# Patient Record
Sex: FEMALE | Race: WHITE | Employment: OTHER | URBAN - METROPOLITAN AREA
[De-identification: names, ages, dates, MRNs, and addresses within clinical notes are randomized per-mention and may not be internally consistent; named-entity substitution may affect disease eponyms.]

---

## 2017-01-06 RX ORDER — CELECOXIB 200 MG/1
CAPSULE ORAL
Qty: 90 CAPSULE | Refills: 1 | Status: SHIPPED | OUTPATIENT
Start: 2017-01-06 | End: 2017-11-27

## 2017-03-07 RX ORDER — MEPROBAMATE 400 MG/1
TABLET ORAL
Qty: 60 TABLET | Refills: 2 | Status: SHIPPED | OUTPATIENT
Start: 2017-03-07 | End: 2017-06-24

## 2017-03-09 RX ORDER — MEPROBAMATE 400 MG/1
TABLET ORAL
Qty: 60 TABLET | Refills: 2 | OUTPATIENT
Start: 2017-03-09

## 2017-03-17 ENCOUNTER — APPOINTMENT (OUTPATIENT)
Dept: GENERAL RADIOLOGY | Facility: HOSPITAL | Age: 82
DRG: 392 | End: 2017-03-17
Attending: HOSPITALIST
Payer: MEDICARE

## 2017-03-17 ENCOUNTER — HOSPITAL ENCOUNTER (INPATIENT)
Facility: HOSPITAL | Age: 82
LOS: 5 days | Discharge: HOME HEALTH | DRG: 392 | End: 2017-03-22
Attending: EMERGENCY MEDICINE | Admitting: HOSPITALIST
Payer: MEDICARE

## 2017-03-17 DIAGNOSIS — E86.0 DEHYDRATION: ICD-10-CM

## 2017-03-17 DIAGNOSIS — R11.2 NAUSEA VOMITING AND DIARRHEA: Primary | ICD-10-CM

## 2017-03-17 DIAGNOSIS — R19.7 NAUSEA VOMITING AND DIARRHEA: Primary | ICD-10-CM

## 2017-03-17 DIAGNOSIS — R53.1 WEAKNESS: ICD-10-CM

## 2017-03-17 PROBLEM — R79.89 AZOTEMIA: Status: ACTIVE | Noted: 2017-03-17

## 2017-03-17 PROBLEM — R73.9 HYPERGLYCEMIA: Status: ACTIVE | Noted: 2017-03-17

## 2017-03-17 LAB
ADENOVIRUS F 40/41 PCR: NEGATIVE
ALBUMIN SERPL BCP-MCNC: 4.1 G/DL (ref 3.5–4.8)
ALP SERPL-CCNC: 52 U/L (ref 32–100)
ALT SERPL-CCNC: 11 U/L (ref 14–54)
ANION GAP SERPL CALC-SCNC: 10 MMOL/L (ref 0–18)
AST SERPL-CCNC: 19 U/L (ref 15–41)
ASTROVIRUS PCR: NEGATIVE
BASOPHILS # BLD: 0 K/UL (ref 0–0.2)
BASOPHILS NFR BLD: 0 %
BILIRUB DIRECT SERPL-MCNC: 0.2 MG/DL (ref 0–0.2)
BILIRUB SERPL-MCNC: 0.7 MG/DL (ref 0.3–1.2)
BUN SERPL-MCNC: 16 MG/DL (ref 8–20)
BUN/CREAT SERPL: 24.6 (ref 10–20)
C CAYETANENSIS DNA SPEC QL NAA+PROBE: NEGATIVE
C. DIFFICILE TOXIN A/B PCR: NEGATIVE
CALCIUM SERPL-MCNC: 9.8 MG/DL (ref 8.5–10.5)
CAMPY SP DNA.DIARRHEA STL QL NAA+PROBE: NEGATIVE
CHLORIDE SERPL-SCNC: 104 MMOL/L (ref 95–110)
CK SERPL-CCNC: 39 U/L (ref 38–234)
CO2 SERPL-SCNC: 23 MMOL/L (ref 22–32)
CREAT SERPL-MCNC: 0.65 MG/DL (ref 0.5–1.5)
CRYPTOSP DNA SPEC QL NAA+PROBE: NEGATIVE
E COLI DNA SPEC QL NAA+PROBE: NEGATIVE
EC STX1+STX2 + H7 FLIC SPEC NAA+PROBE: NEGATIVE
ENTAMOEBA HISTOLYTICA PCR: NEGATIVE
EOSINOPHIL # BLD: 0.1 K/UL (ref 0–0.7)
EOSINOPHIL NFR BLD: 0 %
ERYTHROCYTE [DISTWIDTH] IN BLOOD BY AUTOMATED COUNT: 14.2 % (ref 11–15)
GIARDIA LAMBLIA PCR: NEGATIVE
GLUCOSE SERPL-MCNC: 170 MG/DL (ref 70–99)
HCT VFR BLD AUTO: 43.5 % (ref 35–48)
HGB BLD-MCNC: 14.6 G/DL (ref 12–16)
LIPASE SERPL-CCNC: 26 U/L (ref 22–51)
LYMPHOCYTES # BLD: 1.1 K/UL (ref 1–4)
LYMPHOCYTES NFR BLD: 9 %
MCH RBC QN AUTO: 32 PG (ref 27–32)
MCHC RBC AUTO-ENTMCNC: 33.6 G/DL (ref 32–37)
MCV RBC AUTO: 95.4 FL (ref 80–100)
MONOCYTES # BLD: 0.4 K/UL (ref 0–1)
MONOCYTES NFR BLD: 3 %
NEUTROPHILS # BLD AUTO: 11.4 K/UL (ref 1.8–7.7)
NEUTROPHILS NFR BLD: 87 %
NOROVIRUS GI/GII PCR: NEGATIVE
OSMOLALITY UR CALC.SUM OF ELEC: 289 MOSM/KG (ref 275–295)
P SHIGELLOIDES DNA STL QL NAA+PROBE: NEGATIVE
PLATELET # BLD AUTO: 252 K/UL (ref 140–400)
PMV BLD AUTO: 8.5 FL (ref 7.4–10.3)
POTASSIUM SERPL-SCNC: 3.7 MMOL/L (ref 3.3–5.1)
PROT SERPL-MCNC: 7.2 G/DL (ref 5.9–8.4)
RBC # BLD AUTO: 4.56 M/UL (ref 3.7–5.4)
ROTAVIRUS A PCR: POSITIVE
SALMONELLA DNA SPEC QL NAA+PROBE: NEGATIVE
SAPOVIRUS PCR: NEGATIVE
SODIUM SERPL-SCNC: 137 MMOL/L (ref 136–144)
V CHOLERAE DNA SPEC QL NAA+PROBE: NEGATIVE
VIBRIO DNA SPEC NAA+PROBE: NEGATIVE
WBC # BLD AUTO: 13 K/UL (ref 4–11)
YERSINIA DNA SPEC NAA+PROBE: NEGATIVE

## 2017-03-17 PROCEDURE — 71010 XR CHEST AP PORTABLE  (CPT=71010): CPT

## 2017-03-17 PROCEDURE — 99222 1ST HOSP IP/OBS MODERATE 55: CPT | Performed by: HOSPITALIST

## 2017-03-17 RX ORDER — SODIUM CHLORIDE 9 MG/ML
INJECTION, SOLUTION INTRAVENOUS ONCE
Status: COMPLETED | OUTPATIENT
Start: 2017-03-17 | End: 2017-03-17

## 2017-03-17 RX ORDER — RALOXIFENE HYDROCHLORIDE 60 MG/1
60 TABLET, FILM COATED ORAL
Status: DISCONTINUED | OUTPATIENT
Start: 2017-03-18 | End: 2017-03-22

## 2017-03-17 RX ORDER — ONDANSETRON 2 MG/ML
INJECTION INTRAMUSCULAR; INTRAVENOUS
Status: COMPLETED
Start: 2017-03-17 | End: 2017-03-17

## 2017-03-17 RX ORDER — ATORVASTATIN CALCIUM 40 MG/1
40 TABLET, FILM COATED ORAL NIGHTLY
Status: DISCONTINUED | OUTPATIENT
Start: 2017-03-17 | End: 2017-03-22

## 2017-03-17 RX ORDER — ONDANSETRON 2 MG/ML
4 INJECTION INTRAMUSCULAR; INTRAVENOUS ONCE
Status: COMPLETED | OUTPATIENT
Start: 2017-03-17 | End: 2017-03-17

## 2017-03-17 RX ORDER — LIDOCAINE 50 MG/G
1 PATCH TOPICAL DAILY
Status: DISCONTINUED | OUTPATIENT
Start: 2017-03-17 | End: 2017-03-22

## 2017-03-17 RX ORDER — DEXTROSE AND SODIUM CHLORIDE 5; .45 G/100ML; G/100ML
INJECTION, SOLUTION INTRAVENOUS CONTINUOUS
Status: DISCONTINUED | OUTPATIENT
Start: 2017-03-17 | End: 2017-03-22

## 2017-03-17 RX ORDER — METOPROLOL TARTRATE 100 MG/1
100 TABLET ORAL 2 TIMES DAILY
Status: DISCONTINUED | OUTPATIENT
Start: 2017-03-17 | End: 2017-03-22

## 2017-03-17 RX ORDER — ENOXAPARIN SODIUM 100 MG/ML
40 INJECTION SUBCUTANEOUS DAILY
Status: DISCONTINUED | OUTPATIENT
Start: 2017-03-17 | End: 2017-03-22

## 2017-03-17 RX ORDER — PANTOPRAZOLE SODIUM 40 MG/1
40 TABLET, DELAYED RELEASE ORAL
Status: DISCONTINUED | OUTPATIENT
Start: 2017-03-17 | End: 2017-03-22

## 2017-03-17 RX ORDER — HYDROCODONE BITARTRATE AND ACETAMINOPHEN 7.5; 325 MG/1; MG/1
1 TABLET ORAL EVERY 6 HOURS PRN
Status: DISCONTINUED | OUTPATIENT
Start: 2017-03-17 | End: 2017-03-20

## 2017-03-17 RX ORDER — LOSARTAN POTASSIUM AND HYDROCHLOROTHIAZIDE 25; 100 MG/1; MG/1
1 TABLET ORAL DAILY
COMMUNITY
End: 2018-01-01

## 2017-03-17 RX ORDER — ONDANSETRON 2 MG/ML
4 INJECTION INTRAMUSCULAR; INTRAVENOUS ONCE
Status: DISCONTINUED | OUTPATIENT
Start: 2017-03-17 | End: 2017-03-17

## 2017-03-17 RX ORDER — LOPERAMIDE HYDROCHLORIDE 2 MG/1
2 CAPSULE ORAL EVERY 4 HOURS PRN
Status: DISCONTINUED | OUTPATIENT
Start: 2017-03-17 | End: 2017-03-22

## 2017-03-17 RX ORDER — ACETAMINOPHEN 325 MG/1
650 TABLET ORAL EVERY 6 HOURS PRN
Status: DISCONTINUED | OUTPATIENT
Start: 2017-03-17 | End: 2017-03-22

## 2017-03-17 NOTE — ED NOTES
Patient amb to bathroom with 2 person assist with 1 episode of diarrhea.  Pt states her nausea has subsided

## 2017-03-17 NOTE — PHYSICAL THERAPY NOTE
Pt was to be seen for PT eval in AM. Consulted w/ RN prior to seeing pt. Pt was received supine in bed. Pt declined participating in therapy at this time. Per pt: \"I can't. Every 10 minutes I have diarrhea. This has never happened before.   I'm so embarras

## 2017-03-17 NOTE — ED INITIAL ASSESSMENT (HPI)
Patient states she think she ate something bad yesterday, started with nausea and \"spitting up\" since midnight. Denies abd pain or diarrhea.

## 2017-03-17 NOTE — ED PROVIDER NOTES
Patient Seen in: [facility] AND [facility] Emergency Department    History   Patient presents with:  Nausea/Vomiting/Diarrhea (gastrointestinal)    Stated Complaint: NAUSEA    HPI    Patient complains of vomiting, this began last night, non bloody, non billious. Units by mouth daily. docusate sodium (COLACE) 100 MG Oral Cap,  Take 100 mg by mouth 2 (two) times daily. hydrocodone-acetaminophen (NORCO) 7.5-325 MG Oral Tab,  Take 1 tablet by mouth every 6 (six) hours as needed.    Multiple Vitamins-Minerals (M rashes  PSYCH: calm, cooperative,    Differential includes: viral vs. Food borne or toxin mediated vs. Enteritis vs. Ileus        ED Course     Labs Reviewed   BASIC METABOLIC PANEL (8) - Abnormal; Notable for the following:     Glucose 170 (*)     BUN/CRE DIFFERENTIAL WITH PLATELET.   Procedure                               Abnormality         Status                     ---------                               -----------         ------                     CBC W/ DIFFERENTIAL[[id number]]          Abnormal

## 2017-03-17 NOTE — DISCHARGE PLANNING
RUBEN spoke with son Anthony Smith for initial assessment. Pt lives with son in a single family home with 4 step entry. Son is home during the day to assist as needs arise. Pt has a walker for ambulation. No breathing equipment.  She is usually able to bathe and toilet

## 2017-03-17 NOTE — PLAN OF CARE
Pt admitted from ER alert and oriented x3 not in distress, oriented to unit and room, pt with frequent diarrhea.  Incontinent of bowel and bladder, skin intact, no pain, call light within reach, will continue to monitor

## 2017-03-17 NOTE — H&P
[address] Patient Status:  Inpatient    1924 MRN [id number]   Location [facility] 5SW/SE Attending Rhea oRy MD   Hosp Day # 0 PCP Jesus Thurston MD     Date:  3/17/2017  D CELECOXIB 200 MG Oral Cap TAKE 1 CAPSULE BY MOUTH DAILY. PANTOPRAZOLE SODIUM 40 MG Oral Tab EC TAKE 1 TABLET BY MOUTH EVERY DAY   hydrocortisone 2.5 % External Cream Apply 1 Application topically 2 (two) times daily.    Econazole Nitrate 1 % External Cr CHEST:  Symmetrical movement on inspiration  HEART:  S1 and S2 heard. RRR   LUNGS:  Air entry was good. No crackles or wheezes   ABDOMEN: Soft and non-tender. Bowel sounds were present. MUSCULOSKELETAL:  There was no deformity.   There was full range the clinical documentation in H+P. Based on patients current state of illness, I anticipate that, after discharge, patient will require TBD.

## 2017-03-18 LAB
ANION GAP SERPL CALC-SCNC: 5 MMOL/L (ref 0–18)
BASOPHILS # BLD: 0 K/UL (ref 0–0.2)
BASOPHILS NFR BLD: 0 %
BUN SERPL-MCNC: 17 MG/DL (ref 8–20)
BUN/CREAT SERPL: 26.2 (ref 10–20)
CALCIUM SERPL-MCNC: 8.1 MG/DL (ref 8.5–10.5)
CHLORIDE SERPL-SCNC: 104 MMOL/L (ref 95–110)
CO2 SERPL-SCNC: 23 MMOL/L (ref 22–32)
CREAT SERPL-MCNC: 0.65 MG/DL (ref 0.5–1.5)
EOSINOPHIL # BLD: 0 K/UL (ref 0–0.7)
EOSINOPHIL NFR BLD: 0 %
ERYTHROCYTE [DISTWIDTH] IN BLOOD BY AUTOMATED COUNT: 14.5 % (ref 11–15)
GLUCOSE SERPL-MCNC: 115 MG/DL (ref 70–99)
HCT VFR BLD AUTO: 36.6 % (ref 35–48)
HGB BLD-MCNC: 12.2 G/DL (ref 12–16)
LYMPHOCYTES # BLD: 0.7 K/UL (ref 1–4)
LYMPHOCYTES NFR BLD: 7 %
MAGNESIUM SERPL-MCNC: 1.4 MG/DL (ref 1.8–2.5)
MCH RBC QN AUTO: 31.7 PG (ref 27–32)
MCHC RBC AUTO-ENTMCNC: 33.4 G/DL (ref 32–37)
MCV RBC AUTO: 95.1 FL (ref 80–100)
MONOCYTES # BLD: 1.1 K/UL (ref 0–1)
MONOCYTES NFR BLD: 10 %
NEUTROPHILS # BLD AUTO: 8.8 K/UL (ref 1.8–7.7)
NEUTROPHILS NFR BLD: 82 %
OSMOLALITY UR CALC.SUM OF ELEC: 276 MOSM/KG (ref 275–295)
PLATELET # BLD AUTO: 219 K/UL (ref 140–400)
PMV BLD AUTO: 8 FL (ref 7.4–10.3)
POTASSIUM SERPL-SCNC: 3.1 MMOL/L (ref 3.3–5.1)
RBC # BLD AUTO: 3.85 M/UL (ref 3.7–5.4)
SODIUM SERPL-SCNC: 132 MMOL/L (ref 136–144)
WBC # BLD AUTO: 10.7 K/UL (ref 4–11)

## 2017-03-18 PROCEDURE — 99233 SBSQ HOSP IP/OBS HIGH 50: CPT | Performed by: HOSPITALIST

## 2017-03-18 RX ORDER — ONDANSETRON 2 MG/ML
4 INJECTION INTRAMUSCULAR; INTRAVENOUS EVERY 6 HOURS PRN
Status: DISCONTINUED | OUTPATIENT
Start: 2017-03-18 | End: 2017-03-22

## 2017-03-18 RX ORDER — POTASSIUM CHLORIDE 14.9 MG/ML
20 INJECTION INTRAVENOUS ONCE
Status: COMPLETED | OUTPATIENT
Start: 2017-03-18 | End: 2017-03-19

## 2017-03-18 RX ORDER — ONDANSETRON 2 MG/ML
INJECTION INTRAMUSCULAR; INTRAVENOUS
Status: COMPLETED
Start: 2017-03-18 | End: 2017-03-18

## 2017-03-18 RX ORDER — CLOTRIMAZOLE 1 %
CREAM (GRAM) TOPICAL
Status: DISCONTINUED | OUTPATIENT
Start: 2017-03-18 | End: 2017-03-22

## 2017-03-18 RX ORDER — MAGNESIUM OXIDE 400 MG (241.3 MG MAGNESIUM) TABLET
800 TABLET ONCE
Status: DISCONTINUED | OUTPATIENT
Start: 2017-03-18 | End: 2017-03-22

## 2017-03-18 RX ORDER — ARIPIPRAZOLE 15 MG/1
40 TABLET ORAL EVERY 4 HOURS
Status: DISCONTINUED | OUTPATIENT
Start: 2017-03-18 | End: 2017-03-18

## 2017-03-18 RX ORDER — POTASSIUM CHLORIDE 20 MEQ/1
40 TABLET, EXTENDED RELEASE ORAL EVERY 4 HOURS
Status: DISCONTINUED | OUTPATIENT
Start: 2017-03-18 | End: 2017-03-18

## 2017-03-18 NOTE — PHYSICAL THERAPY NOTE
Attempted to see pt this am. Refused stated that is nauseated. Attempt to see pt again this pm. Per nursing to hold for today as she cont to be nauseated. Will follow up tomorrow.

## 2017-03-18 NOTE — PLAN OF CARE
Problem: Patient Centered Care  Goal: Patient preferences are identified and integrated in the patient’s plan of care  Interventions:  - What would you like us to know as we care for you?   - Provide timely, complete, and accurate information to patient/fa

## 2017-03-18 NOTE — PROGRESS NOTES
Econzaole cream has been substituted with clotrimazole cream per P & T Committee Protocol.     Cain Sorensen, PharmD

## 2017-03-18 NOTE — PLAN OF CARE
Problem: Patient/Family Goals  Goal: Patient/Family Short Term Goal  Patient’s Short Term Goal: decrease diarrhea    Interventions:   - medication  -diet intake  -increase ambulation.  - See additional Care Plan goals for specific interventions   Outcome:

## 2017-03-18 NOTE — PLAN OF CARE
Problem: Patient/Family Goals  Goal: Patient/Family Long Term Goal  Patient’s Long Term Goal: home with family    Interventions:  - decrease diarrhea  -pt/ot  -increase strength  - See additional Care Plan goals for specific interventions   Outcome: Progre

## 2017-03-18 NOTE — PROGRESS NOTES
Received call from microbiology that patient is positive for rotavirus. Dr Bhupendra Evans aware with order imodium q 4 hours prn.

## 2017-03-18 NOTE — OCCUPATIONAL THERAPY NOTE
Attempted to see pt for OT evaluation, however per RN Myla Oppenheim, requested to hold today as pt is sick to her stomach and attempt tomorrow. Will continue to follow.      Franklyn Bojorquez, OTR/L 3/18/2017

## 2017-03-19 LAB
ANION GAP SERPL CALC-SCNC: 8 MMOL/L (ref 0–18)
BASOPHILS # BLD: 0 K/UL (ref 0–0.2)
BASOPHILS NFR BLD: 0 %
BUN SERPL-MCNC: 11 MG/DL (ref 8–20)
BUN/CREAT SERPL: 16.2 (ref 10–20)
CALCIUM SERPL-MCNC: 7.9 MG/DL (ref 8.5–10.5)
CHLORIDE SERPL-SCNC: 100 MMOL/L (ref 95–110)
CO2 SERPL-SCNC: 22 MMOL/L (ref 22–32)
CREAT SERPL-MCNC: 0.68 MG/DL (ref 0.5–1.5)
EOSINOPHIL # BLD: 0 K/UL (ref 0–0.7)
EOSINOPHIL NFR BLD: 0 %
ERYTHROCYTE [DISTWIDTH] IN BLOOD BY AUTOMATED COUNT: 14.1 % (ref 11–15)
GLUCOSE SERPL-MCNC: 105 MG/DL (ref 70–99)
HCT VFR BLD AUTO: 35.6 % (ref 35–48)
HGB BLD-MCNC: 11.9 G/DL (ref 12–16)
LYMPHOCYTES # BLD: 0.8 K/UL (ref 1–4)
LYMPHOCYTES NFR BLD: 7 %
MAGNESIUM SERPL-MCNC: 2.1 MG/DL (ref 1.8–2.5)
MCH RBC QN AUTO: 32.1 PG (ref 27–32)
MCHC RBC AUTO-ENTMCNC: 33.5 G/DL (ref 32–37)
MCV RBC AUTO: 95.9 FL (ref 80–100)
MONOCYTES # BLD: 1.1 K/UL (ref 0–1)
MONOCYTES NFR BLD: 10 %
NEUTROPHILS # BLD AUTO: 9.3 K/UL (ref 1.8–7.7)
NEUTROPHILS NFR BLD: 83 %
OSMOLALITY UR CALC.SUM OF ELEC: 270 MOSM/KG (ref 275–295)
PLATELET # BLD AUTO: 181 K/UL (ref 140–400)
PMV BLD AUTO: 9.1 FL (ref 7.4–10.3)
POTASSIUM SERPL-SCNC: 4.6 MMOL/L (ref 3.3–5.1)
RBC # BLD AUTO: 3.71 M/UL (ref 3.7–5.4)
SODIUM SERPL-SCNC: 130 MMOL/L (ref 136–144)
WBC # BLD AUTO: 11.2 K/UL (ref 4–11)

## 2017-03-19 PROCEDURE — 99233 SBSQ HOSP IP/OBS HIGH 50: CPT | Performed by: HOSPITALIST

## 2017-03-19 RX ORDER — DICYCLOMINE HYDROCHLORIDE 10 MG/1
10 CAPSULE ORAL
Status: DISCONTINUED | OUTPATIENT
Start: 2017-03-19 | End: 2017-03-22

## 2017-03-19 RX ORDER — HYDROCODONE BITARTRATE AND ACETAMINOPHEN 7.5; 325 MG/1; MG/1
1 TABLET ORAL ONCE
Status: COMPLETED | OUTPATIENT
Start: 2017-03-19 | End: 2017-03-19

## 2017-03-19 NOTE — PLAN OF CARE
Problem: Patient Centered Care  Goal: Patient preferences are identified and integrated in the patient’s plan of care  Interventions:  - What would you like us to know as we care for you?   - Provide timely, complete, and accurate information to patient/fa precautions as indicated by assessment.  - Educate pt/family on patient safety including physical limitations  - Instruct pt to call for assistance with activity based on assessment  - Modify environment to reduce risk of injury  - Provide assistive device
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
## 2017-03-19 NOTE — PHYSICAL THERAPY NOTE
PHYSICAL THERAPY EVALUATION - INPATIENT     Room Number: 571/571-A  Evaluation Date: 3/19/2017  Type of Evaluation: Initial PT evaluation  Physician Order: PT Eval and Treat    Presenting Problem: 80year old female was admitted iwth complaint of nause ASSESSMENT  Upper extremity ROM and strength are within functional limits     Lower extremity ROM is within functional limits     Lower extremity strength is within functional limits     BALANCE  Static Sitting: Good  Dynamic Sitting: Good  Static Standing baseline and would benefit from skilled inpatient PT to address the above deficits to assist patient in returning to prior level of function. Recommend home health PT after discharge from acute care.      DISCHARGE RECOMMENDATIONS  PT Discharge Recommendati

## 2017-03-19 NOTE — PROGRESS NOTES
[facility] - [facility]    Progress Note    Adebayo Gilbert Patient Status:  Inpatient    1924 MRN [id number]   Location [facility] 5SW/SE Attending Helio Waldron MD   [address] Day # 2 PCP Jesus Thurston MD       SUBJECTIVE:    No CP, Medications:  magnesium oxide (MAG-OX) tab 800 mg 800 mg Oral Once   ondansetron HCl (ZOFRAN) injection 4 mg 4 mg Intravenous Q6H PRN   clotrimazole (LOTRIMIN) 1 % cream  Topical [email or web address]   Atorvastatin Calcium (LIPITOR) tab 40 mg 40 mg Oral Nightly Pending    Greater than 35 minutes spent, >50% spent counseling re: treatment plan and workup      Josie Briceno MD

## 2017-03-20 LAB
ANION GAP SERPL CALC-SCNC: 9 MMOL/L (ref 0–18)
BASOPHILS # BLD: 0 K/UL (ref 0–0.2)
BASOPHILS NFR BLD: 0 %
BILIRUB UR QL: NEGATIVE
BUN SERPL-MCNC: 7 MG/DL (ref 8–20)
BUN/CREAT SERPL: 12.5 (ref 10–20)
CALCIUM SERPL-MCNC: 7.9 MG/DL (ref 8.5–10.5)
CHLORIDE SERPL-SCNC: 101 MMOL/L (ref 95–110)
CO2 SERPL-SCNC: 20 MMOL/L (ref 22–32)
COLOR UR: YELLOW
CREAT SERPL-MCNC: 0.56 MG/DL (ref 0.5–1.5)
EOSINOPHIL # BLD: 0 K/UL (ref 0–0.7)
EOSINOPHIL NFR BLD: 1 %
ERYTHROCYTE [DISTWIDTH] IN BLOOD BY AUTOMATED COUNT: 14.3 % (ref 11–15)
GLUCOSE SERPL-MCNC: 102 MG/DL (ref 70–99)
GLUCOSE UR-MCNC: NEGATIVE MG/DL
HCT VFR BLD AUTO: 36.2 % (ref 35–48)
HGB BLD-MCNC: 12.3 G/DL (ref 12–16)
KETONES UR-MCNC: NEGATIVE MG/DL
LYMPHOCYTES # BLD: 0.9 K/UL (ref 1–4)
LYMPHOCYTES NFR BLD: 11 %
MCH RBC QN AUTO: 32.2 PG (ref 27–32)
MCHC RBC AUTO-ENTMCNC: 34.1 G/DL (ref 32–37)
MCV RBC AUTO: 94.4 FL (ref 80–100)
MONOCYTES # BLD: 1 K/UL (ref 0–1)
MONOCYTES NFR BLD: 12 %
NEUTROPHILS # BLD AUTO: 6.1 K/UL (ref 1.8–7.7)
NEUTROPHILS NFR BLD: 76 %
NITRITE UR QL STRIP.AUTO: NEGATIVE
OSMOLALITY UR CALC.SUM OF ELEC: 268 MOSM/KG (ref 275–295)
PH UR: 6 [PH] (ref 5–8)
PLATELET # BLD AUTO: 189 K/UL (ref 140–400)
PMV BLD AUTO: 7.8 FL (ref 7.4–10.3)
POTASSIUM SERPL-SCNC: 3.9 MMOL/L (ref 3.3–5.1)
PROT UR-MCNC: 30 MG/DL
RBC # BLD AUTO: 3.84 M/UL (ref 3.7–5.4)
RBC #/AREA URNS AUTO: 156 /HPF
SODIUM SERPL-SCNC: 130 MMOL/L (ref 136–144)
SP GR UR STRIP: 1 (ref 1–1.03)
UROBILINOGEN UR STRIP-ACNC: <2
VIT C UR-MCNC: NEGATIVE MG/DL
WBC # BLD AUTO: 8 K/UL (ref 4–11)
WBC #/AREA URNS AUTO: 951 /HPF

## 2017-03-20 PROCEDURE — 99232 SBSQ HOSP IP/OBS MODERATE 35: CPT | Performed by: HOSPITALIST

## 2017-03-20 RX ORDER — HYDROCODONE BITARTRATE AND ACETAMINOPHEN 7.5; 325 MG/1; MG/1
1 TABLET ORAL EVERY 4 HOURS PRN
Status: DISCONTINUED | OUTPATIENT
Start: 2017-03-20 | End: 2017-03-22

## 2017-03-20 NOTE — CM/SW NOTE
Met with patient at bedside. Patient lives at home with son. Asked patient regarding plans for discharge. Patient states she will be going home with son when doctor orders her discharge. [facility].  Offer

## 2017-03-20 NOTE — PROGRESS NOTES
[facility]    Progress Note    Louann Singh Patient Status:  Inpatient    1924 MRN [id number]   Location [facility] 5SW/SE Attending John Mi MD   Hosp Day # 3 PCP Jamin Jackson MD       SUBJECTIVE:    No CP, Calcium (LIPITOR) tab 40 mg 40 mg Oral Nightly   Cholecalciferol (VITAMIN D) 1000 units tab 2,000 Units 2,000 Units Oral Daily   hydrocortisone 2.5 % cream 1 Application 1 Application Topical BID   lidocaine (LIDODERM) 5 % 1 patch 1 patch Transdermal Daily

## 2017-03-20 NOTE — HOME CARE LIAISON
MET WITH PTNT TO DISCUSS HOME HEALTH SERVICES AND COVERAGE CRITERIA. PTNT AGREEABLE TO [address] [place] [address]. PTNT GIVEN RESIDENTIAL BROCHURE AND CONTACT INFORMATION. Nuria RN/PT.   PTNT ADMITTED TO [facility] 3/17/17, D/T N&V, POSI

## 2017-03-20 NOTE — PLAN OF CARE
Problem: Patient Centered Care  Goal: Patient preferences are identified and integrated in the patient’s plan of care  Interventions:  - What would you like us to know as we care for you?   - Provide timely, complete, and accurate information to patient/fa injury  INTERVENTIONS:  - Assess pt frequently for physical needs  - Identify cognitive and physical deficits and behaviors that affect risk of falls.   - [place] fall precautions as indicated by assessment.  - Educate pt/family on patient safety includin

## 2017-03-21 LAB
BACTERIA UR QL AUTO: NEGATIVE /HPF
BILIRUB UR QL: NEGATIVE
CLARITY UR: CLEAR
COLOR UR: YELLOW
GLUCOSE UR-MCNC: NEGATIVE MG/DL
KETONES UR-MCNC: NEGATIVE MG/DL
LEUKOCYTE ESTERASE UR QL STRIP.AUTO: NEGATIVE
MAGNESIUM SERPL-MCNC: 1.4 MG/DL (ref 1.8–2.5)
NITRITE UR QL STRIP.AUTO: NEGATIVE
PH UR: 5 [PH] (ref 5–8)
PROT UR-MCNC: NEGATIVE MG/DL
RBC #/AREA URNS AUTO: 2 /HPF
SP GR UR STRIP: 1.01 (ref 1–1.03)
UROBILINOGEN UR STRIP-ACNC: <2
VIT C UR-MCNC: NEGATIVE MG/DL
WBC #/AREA URNS AUTO: <1 /HPF

## 2017-03-21 PROCEDURE — 99232 SBSQ HOSP IP/OBS MODERATE 35: CPT | Performed by: HOSPITALIST

## 2017-03-21 RX ORDER — MAGNESIUM OXIDE 400 MG (241.3 MG MAGNESIUM) TABLET
800 TABLET ONCE
Status: COMPLETED | OUTPATIENT
Start: 2017-03-21 | End: 2017-03-21

## 2017-03-21 NOTE — PLAN OF CARE
Problem: Patient Centered Care  Goal: Patient preferences are identified and integrated in the patient’s plan of care  Interventions:  - What would you like us to know as we care for you?   - Provide timely, complete, and accurate information to patient/fa Norco's frequency to be adjusted, because Q6hrs was not sufficient. The frequency has been changed from Q6hrs to Q4hrs.      Problem: SAFETY ADULT - FALL  Goal: Free from fall injury  INTERVENTIONS:  - Assess pt frequently for physical needs  - Identify cog

## 2017-03-21 NOTE — PLAN OF CARE
Problem: Patient Centered Care  Goal: Patient preferences are identified and integrated in the patient’s plan of care  Interventions:  - What would you like us to know as we care for you?   - Provide timely, complete, and accurate information to patient/fa new pain   Outcome: Progressing  Norco given PRN for pain  Will continue to monitor safety measures met     Problem: SAFETY ADULT - FALL  Goal: Free from fall injury  INTERVENTIONS:  - Assess pt frequently for physical needs  - Identify cognitive and physi

## 2017-03-21 NOTE — PROGRESS NOTES
[facility]    Progress Note    Abena Davinin Patient Status:  Inpatient    1924 MRN [id number]   Location [facility] 5SW/SE Attending Jaedn Pereyra MD   Hosp Day # 4 PCP Gonsalo Banks MD       SUBJECTIVE:    No CP, tab 100 mg 100 mg Oral BID   Pantoprazole Sodium (PROTONIX) EC tab 40 mg 40 mg Oral Daily   Raloxifene HCl (EVISTA) tab 60 mg 60 mg Oral Daily   Enoxaparin Sodium (LOVENOX) 40 MG/0.4ML injection 40 mg 40 mg Subcutaneous Daily   dextrose 5 %-0.45 % NaCl inf

## 2017-03-21 NOTE — CM/SW NOTE
Met with patient at bedside to explain the BPCI/Medicare program. Patient agreed with phone follow up for 3 months from [address] [place] [address] after discharge from [address] [place] [address]. Patient was enrolled under DRG   392  . BPCI/Medicare letter and brochure provided.   Patient i

## 2017-03-21 NOTE — PHYSICAL THERAPY NOTE
RN reports patient is not feeling well this afternoon, patient declines therapy as she is having diarrhea again. Will check back at a later time.

## 2017-03-21 NOTE — OCCUPATIONAL THERAPY NOTE
Attempted OT treatment this afternoon. Patient reports feeling nauseous and continuing to have diarrhea. She requested I return at a later time. Will reattempt as able.

## 2017-03-22 VITALS
TEMPERATURE: 98 F | BODY MASS INDEX: 35.14 KG/M2 | WEIGHT: 179 LBS | HEIGHT: 60 IN | DIASTOLIC BLOOD PRESSURE: 81 MMHG | HEART RATE: 70 BPM | SYSTOLIC BLOOD PRESSURE: 179 MMHG | OXYGEN SATURATION: 99 % | RESPIRATION RATE: 18 BRPM

## 2017-03-22 LAB — MAGNESIUM SERPL-MCNC: 1.5 MG/DL (ref 1.8–2.5)

## 2017-03-22 PROCEDURE — 99239 HOSP IP/OBS DSCHRG MGMT >30: CPT | Performed by: HOSPITALIST

## 2017-03-22 RX ORDER — MAGNESIUM OXIDE 400 MG (241.3 MG MAGNESIUM) TABLET
400 TABLET 2 TIMES DAILY
Qty: 14 TABLET | Refills: 0 | Status: SHIPPED
Start: 2017-03-22 | End: 2018-01-01

## 2017-03-22 RX ORDER — MAGNESIUM OXIDE 400 MG (241.3 MG MAGNESIUM) TABLET
800 TABLET ONCE
Status: COMPLETED | OUTPATIENT
Start: 2017-03-22 | End: 2017-03-22

## 2017-03-22 RX ORDER — MAGNESIUM OXIDE 400 MG (241.3 MG MAGNESIUM) TABLET
400 TABLET 2 TIMES DAILY
Status: DISCONTINUED | OUTPATIENT
Start: 2017-03-22 | End: 2017-03-22

## 2017-03-22 NOTE — PLAN OF CARE
Pt discharged home in stable condition with all of her belongings accompanied by [employer] staff. Discharge instructions given to pt. Pt verbalized understanding. Son notified of discharge by . Iv discontinued.

## 2017-03-22 NOTE — PLAN OF CARE
Problem: Patient Centered Care  Goal: Patient preferences are identified and integrated in the patient’s plan of care  Interventions:  - What would you like us to know as we care for you?   - Provide timely, complete, and accurate information to patient/fa FALL  Goal: Free from fall injury  INTERVENTIONS:  - Assess pt frequently for physical needs  - Identify cognitive and physical deficits and behaviors that affect risk of falls.   - [place] fall precautions as indicated by assessment.  - Educate pt/family

## 2017-03-22 NOTE — DISCHARGE SUMMARY
[facility]    Discharge Summary    Evan Wiggins Patient Status:  Inpatient    1924 MRN [id number]   Location [facility] 5SW/SE Attending Kiran Ugarte MD   Hosp Day # 5 PCP Grey Ocasio MD     Date of Admission: outpt    8.  Pyuria  -given rocephin x 1  -likely contaminated specimen  -straight cath for urine with culture reflex was negative     VTE P:  lovenox    Discharge Medications:      Discharge Medications      START taking these medications       Instruction lidocaine 5 % Ptch   Commonly known as:  LIDODERM         Refills:  0       Losartan Potassium-HCTZ 100-25 MG Tabs   Commonly known as:  HYZAAR        Take 1 tablet by mouth daily.     Refills:  0       Meprobamate 400 MG Tabs        TAKE 1 TABLET BY MOUTH

## 2017-03-22 NOTE — PLAN OF CARE
Problem: Patient Centered Care  Goal: Patient preferences are identified and integrated in the patient’s plan of care  Interventions:  - What would you like us to know as we care for you?   - Provide timely, complete, and accurate information to patient/fa fall injury  INTERVENTIONS:  - Assess pt frequently for physical needs  - Identify cognitive and physical deficits and behaviors that affect risk of falls.   - [place] fall precautions as indicated by assessment.  - Educate pt/family on patient safety inc

## 2017-03-22 NOTE — PHYSICAL THERAPY NOTE
Patient received in bed in supine but refusing to work with PT.  Patient has only been seen for evaluation but refused repeated attempts for therapy while in hospital. She is currently agitated about being discharged in 1 hour and not wearing regular clothe

## 2017-03-22 NOTE — DISCHARGE PLANNING
[facility] notified and will send medicar here to transport pt home toay at 12:30. Message left for patients son and patients sister Maikel Cantu notified that she will be on her way home at 12:30 today.  [place] [id number] notified of pt dc today    Patient notified

## 2017-03-23 ENCOUNTER — TELEPHONE (OUTPATIENT)
Dept: NEPHROLOGY | Facility: CLINIC | Age: 82
End: 2017-03-23

## 2017-03-23 NOTE — TELEPHONE ENCOUNTER
CATHY Mcgrath/[facility] called to let Dr Delia Blackmno know that pt will be starting with home care on 3/27/17 per pt request.  No need to call back unless questions. Aware MKK not in office today.

## 2017-03-27 NOTE — TELEPHONE ENCOUNTER
CATHY Bermudez/[facility] states that pt is now requesting to delay home care until 3/31/17. No need to call back unless questions.

## 2017-03-31 NOTE — TELEPHONE ENCOUNTER
Aidan calling to inform MKK that pt has refused [facility]. Pt is d/c pt. For add'l questions pls call. Thank you.

## 2017-04-03 NOTE — TELEPHONE ENCOUNTER
CATHY Cowan/[facility] called to let MKK know that pt now would like [facility] so she has appt scheduled on 4/4/17.

## 2017-04-04 NOTE — TELEPHONE ENCOUNTER
Spoke to [place] since we've been getting several messages of patient refusing [facility] then deciding to do it. He states as of now, she's just refusing therapy but nurse is trying to go out to see her today.

## 2017-04-04 NOTE — TELEPHONE ENCOUNTER
RN calling to inform that the pt. Is refusing for a 2nd time, to receive home health services and PT.

## 2017-04-21 ENCOUNTER — TELEPHONE (OUTPATIENT)
Dept: CARDIOLOGY CLINIC | Facility: CLINIC | Age: 82
End: 2017-04-21

## 2017-04-21 RX ORDER — METOPROLOL TARTRATE 100 MG/1
100 TABLET ORAL 2 TIMES DAILY
Qty: 60 TABLET | Refills: 0 | Status: SHIPPED | OUTPATIENT
Start: 2017-04-21 | End: 2017-05-22

## 2017-04-21 NOTE — TELEPHONE ENCOUNTER
Metoprolol Tartr 100mg tabs, take 1 tab by mouth 2 (two) times daily, qty 180 w/ 3 refills    Current outpatient prescriptions:   •  Metoprolol Tartrate 100 MG Oral Tab, Take 1 tablet (100 mg total) by mouth 2 (two) times daily. , Disp: 180 tablet, Rfl: 3

## 2017-05-22 ENCOUNTER — TELEPHONE (OUTPATIENT)
Dept: CARDIOLOGY CLINIC | Facility: CLINIC | Age: 82
End: 2017-05-22

## 2017-05-22 RX ORDER — METOPROLOL TARTRATE 100 MG/1
100 TABLET ORAL 2 TIMES DAILY
Qty: 60 TABLET | Refills: 0 | Status: SHIPPED | OUTPATIENT
Start: 2017-05-22 | End: 2017-06-21

## 2017-05-22 NOTE — TELEPHONE ENCOUNTER
Metoprolol Tartr 100mg tabs, take 1 tab (100mg total) by mouth 2 (twp) times daily, qty 60    Current outpatient prescriptions:   •  Metoprolol Tartrate 100 MG Oral Tab, Take 1 tablet (100 mg total) by mouth 2 (two) times daily.  Make appointment with MD.,

## 2017-05-22 NOTE — TELEPHONE ENCOUNTER
Pt was reminded she needs an office visit. Pt will call back later as she is getting ready for another appointment now.

## 2017-05-23 ENCOUNTER — TELEPHONE (OUTPATIENT)
Dept: OBGYN CLINIC | Facility: CLINIC | Age: 82
End: 2017-05-23

## 2017-05-23 NOTE — TELEPHONE ENCOUNTER
Pt was supposed to return in 3 months. Last cleaning in October so pt needs an appt. Please help her schedule this, thanks!

## 2017-05-25 ENCOUNTER — OFFICE VISIT (OUTPATIENT)
Dept: OBGYN CLINIC | Facility: CLINIC | Age: 82
End: 2017-05-25

## 2017-05-25 VITALS — SYSTOLIC BLOOD PRESSURE: 159 MMHG | HEART RATE: 69 BPM | DIASTOLIC BLOOD PRESSURE: 83 MMHG

## 2017-05-25 DIAGNOSIS — N81.11 MIDLINE CYSTOCELE: Primary | ICD-10-CM

## 2017-05-25 PROCEDURE — 99212 OFFICE O/P EST SF 10 MIN: CPT | Performed by: OBSTETRICS & GYNECOLOGY

## 2017-05-25 RX ORDER — PANTOPRAZOLE SODIUM 40 MG/1
TABLET, DELAYED RELEASE ORAL
COMMUNITY
Start: 2017-04-03 | End: 2018-01-01 | Stop reason: ALTCHOICE

## 2017-05-25 NOTE — TELEPHONE ENCOUNTER
Pt calling to ask when her last pessary cleaning was because she knows she is overdue. Informed her she was last seen in October.  Pt has an appt today with JUICE but states she might need to reschedule to next week because her cat is sick and she is \"not del rio

## 2017-05-26 NOTE — PROGRESS NOTES
Pessary Check     Bertaeveline Nina [id number]  here for pessary check. Last seen 10/14/16. Doing well with pessary. Has good support. Has no complaints.     Pessary type: ring with support pessary    Pessary removed, cleansed and replaced without diffi

## 2017-06-21 ENCOUNTER — TELEPHONE (OUTPATIENT)
Dept: CARDIOLOGY CLINIC | Facility: CLINIC | Age: 82
End: 2017-06-21

## 2017-06-21 RX ORDER — METOPROLOL TARTRATE 100 MG/1
100 TABLET ORAL 2 TIMES DAILY
Qty: 60 TABLET | Refills: 0 | Status: SHIPPED | OUTPATIENT
Start: 2017-06-21 | End: 2017-07-26

## 2017-06-21 NOTE — TELEPHONE ENCOUNTER
Rx sent, S/w pt- she will call back next week to make appt, she states it's difficult for her to come out to see doctor.

## 2017-06-21 NOTE — TELEPHONE ENCOUNTER
Metoprolol Tartr 100mg tabs, take 1 tab (100mg total) by mouth 2 (two) times daily, qty 60    Current outpatient prescriptions:   •  Metoprolol Tartrate 100 MG Oral Tab, Take 1 tablet (100 mg total) by mouth 2 (two) times daily.  Make appointment with MD.,

## 2017-06-26 RX ORDER — MEPROBAMATE 400 MG/1
TABLET ORAL
Qty: 60 TABLET | Refills: 2 | OUTPATIENT
Start: 2017-06-26 | End: 2017-10-17

## 2017-06-26 NOTE — TELEPHONE ENCOUNTER
Patient returned call. She is aware that Dr. Charlotte Rea would like her to do lab work ordered 12/2/16. Instructed to fast 12 hours.  She is having eye surgery on Tuesday and said she would do lab work as requested and will call back to schedule a follow up vis

## 2017-06-27 ENCOUNTER — TELEPHONE (OUTPATIENT)
Dept: NEPHROLOGY | Facility: CLINIC | Age: 82
End: 2017-06-27

## 2017-06-27 NOTE — TELEPHONE ENCOUNTER
Rx for meprobamate was authorized by [employer] yesterday but due to Epic update, [facility]'s printer wasn't working so script did not get printed or signed.  Called [facility], spoke to Hannah Butcher, and gave verbal authorization for meprobamate 400 mg BID PRN #60 with 2 additional

## 2017-06-27 NOTE — TELEPHONE ENCOUNTER
[facility] requesting refills for Meprobamate 400 mg. Pls call. Thank you.       Current Outpatient Prescriptions:  MEPROBAMATE 400 MG Oral Tab TAKE 1 TABLET BY MOUTH TWICE A DAY AS NEEDED FOR PAIN Disp: 60 tablet Rfl: 2

## 2017-07-26 ENCOUNTER — TELEPHONE (OUTPATIENT)
Dept: CARDIOLOGY CLINIC | Facility: CLINIC | Age: 82
End: 2017-07-26

## 2017-07-26 RX ORDER — METOPROLOL TARTRATE 100 MG/1
100 TABLET ORAL 2 TIMES DAILY
Qty: 60 TABLET | Refills: 0 | Status: SHIPPED | OUTPATIENT
Start: 2017-07-26 | End: 2017-10-02

## 2017-08-10 ENCOUNTER — TELEPHONE (OUTPATIENT)
Dept: NEPHROLOGY | Facility: CLINIC | Age: 82
End: 2017-08-10

## 2017-08-10 NOTE — TELEPHONE ENCOUNTER
----- Message from aMria C Yang Stringer, Dr. Zepead, ([facility] or [facility])- There is also sports medicine available at [place].    Thank you,  WILDA Yang   PATY Rn has no info for pt  - pls fax hx , med list ,  orders - pls fax to [phone]

## 2017-08-10 NOTE — TELEPHONE ENCOUNTER
Spoke to [facility] nurse. She states HH referral was placed by [facility] Dr. Darren Cerda. Intake visit was completed and [address] feels patient is not being care for. She thinks patient needs to be placed in a Nursing home.  She had me schedule an appointmen

## 2017-08-10 NOTE — TELEPHONE ENCOUNTER
No record of patient being admitted to [facility]. Pleaase advise, no orders in chart and  is asking for orders and history.

## 2017-08-21 ENCOUNTER — TELEPHONE (OUTPATIENT)
Dept: CARDIOLOGY CLINIC | Facility: CLINIC | Age: 82
End: 2017-08-21

## 2017-08-21 NOTE — TELEPHONE ENCOUNTER
Pt was called and reminded she needs a follow up visit with MDB. Pt states she is bed ridden and is not able to go down the stairs. She states she still has enough medication at home. She does not need this refill at this time.  She will call back to schedu

## 2017-08-21 NOTE — TELEPHONE ENCOUNTER
Metoprolol Tartrate 100mg tab, take 1 tab (100mg total) by mouth 2 (two) times daily, qty 60. Pt cancelled 08/08 appt w/ RENETTA.       Current Outpatient Prescriptions:   •  Metoprolol Tartrate 100 MG Oral Tab, Take 1 tablet (100 mg total) by mouth 2 (two) ti

## 2017-09-15 ENCOUNTER — TELEPHONE (OUTPATIENT)
Dept: NEPHROLOGY | Facility: CLINIC | Age: 82
End: 2017-09-15

## 2017-09-15 NOTE — TELEPHONE ENCOUNTER
Pt and her son both have appt for Monday - she is at 2:40 and her son is at 1 pm , asking if she can be seen earlier - closer to sons time - she is in a wheel chair

## 2017-10-02 ENCOUNTER — TELEPHONE (OUTPATIENT)
Dept: CARDIOLOGY CLINIC | Facility: CLINIC | Age: 82
End: 2017-10-02

## 2017-10-02 RX ORDER — METOPROLOL TARTRATE 100 MG/1
100 TABLET ORAL 2 TIMES DAILY
Qty: 60 TABLET | Refills: 0 | Status: SHIPPED | OUTPATIENT
Start: 2017-10-02 | End: 2017-10-30

## 2017-10-02 NOTE — TELEPHONE ENCOUNTER
Pt called for refill. Pt states that she is not able to make appt at this time due to health issues:      Current Outpatient Prescriptions:   •  Metoprolol Tartrate 100 MG Oral Tab, Take 1 tablet (100 mg total) by mouth 2 (two) times daily. , Disp: 60 tabl

## 2017-10-02 NOTE — TELEPHONE ENCOUNTER
S/w Sudarshan Jay and states she is still not able to get out of her home and she is scared to fall.  She has cancelled her appointment with her PCP and states she is not able to come in for an office visit with Dr. Robert Lovelace    Pt has home health nurse coming toda

## 2017-10-13 ENCOUNTER — TELEPHONE (OUTPATIENT)
Dept: NEPHROLOGY | Facility: CLINIC | Age: 82
End: 2017-10-13

## 2017-10-13 DIAGNOSIS — R73.09 ELEVATED GLUCOSE LEVEL: Primary | ICD-10-CM

## 2017-10-13 NOTE — TELEPHONE ENCOUNTER
Patient contacted. Advised that lab orders were faxed to [facility]. Patient is going to call the office to schedule an appointment after she has her labs drawn. Instructed to fast 12 hours for lab work.

## 2017-10-13 NOTE — TELEPHONE ENCOUNTER
Pt is requesting to have blood work faxed over for [address] provider to complete that is needed. Pt states she has trouble walking and it would be very hard for her to come into the lab.  Pt states Home health provider would fax results from blood work c

## 2017-10-13 NOTE — TELEPHONE ENCOUNTER
Glycohgb added to existing lab orders and faxed to [facility] Attn: Verónica Calles as requested.

## 2017-10-15 DIAGNOSIS — E78.00 PURE HYPERCHOLESTEROLEMIA: Primary | ICD-10-CM

## 2017-10-16 RX ORDER — RALOXIFENE HYDROCHLORIDE 60 MG/1
60 TABLET, FILM COATED ORAL
Qty: 30 TABLET | Refills: 0 | Status: SHIPPED | OUTPATIENT
Start: 2017-10-16 | End: 2017-12-18

## 2017-10-16 NOTE — TELEPHONE ENCOUNTER
Patient contacted. She is aware that lab work and office visit are needed but she states great difficulty getting out of the house. She will try. Lab orders entered in system and patient is aware that she needs to fast 12 hours for lab work.

## 2017-10-17 RX ORDER — MEPROBAMATE 400 MG/1
TABLET ORAL
Qty: 60 TABLET | Refills: 0 | Status: SHIPPED | OUTPATIENT
Start: 2017-10-17 | End: 2017-11-27

## 2017-10-17 NOTE — TELEPHONE ENCOUNTER
Pt called for status on refill. She states that she is having problems get lab tests done that were requested because she is bedridden. Please call. Pt is out of meds.

## 2017-10-23 ENCOUNTER — TELEPHONE (OUTPATIENT)
Dept: NEPHROLOGY | Facility: CLINIC | Age: 82
End: 2017-10-23

## 2017-10-23 NOTE — TELEPHONE ENCOUNTER
Per Dr. Candy Kumar are ok except cholesterol is too high. See soon to review. Patient states she will call back to schedule an office visit. Original results sent to scan. Copy in hold folder.

## 2017-10-30 ENCOUNTER — TELEPHONE (OUTPATIENT)
Dept: CARDIOLOGY CLINIC | Facility: CLINIC | Age: 82
End: 2017-10-30

## 2017-10-30 RX ORDER — METOPROLOL TARTRATE 100 MG/1
100 TABLET ORAL 2 TIMES DAILY
Qty: 60 TABLET | Refills: 0 | Status: SHIPPED | OUTPATIENT
Start: 2017-10-30 | End: 2017-12-05

## 2017-10-30 NOTE — TELEPHONE ENCOUNTER
Metoprolol Tartr 100mg tabs, take 1 (100mg total) by mouth 2 (two) times daily, qty 60    Current Outpatient Prescriptions:   •  Metoprolol Tartrate 100 MG Oral Tab, Take 1 tablet (100 mg total) by mouth 2 (two) times daily. , Disp: 60 tablet, Rfl: 0

## 2017-11-27 RX ORDER — MEPROBAMATE 400 MG/1
TABLET ORAL
Qty: 60 TABLET | Refills: 0 | Status: SHIPPED | OUTPATIENT
Start: 2017-11-27 | End: 2018-01-05

## 2017-11-27 RX ORDER — METOPROLOL TARTRATE 100 MG/1
100 TABLET ORAL 2 TIMES DAILY
Qty: 60 TABLET | Refills: 0 | OUTPATIENT
Start: 2017-11-27

## 2017-11-27 RX ORDER — CELECOXIB 200 MG/1
CAPSULE ORAL
Qty: 90 CAPSULE | Refills: 0 | Status: SHIPPED | OUTPATIENT
Start: 2017-11-27 | End: 2018-01-01

## 2017-11-28 ENCOUNTER — OFFICE VISIT (OUTPATIENT)
Dept: NEPHROLOGY | Facility: CLINIC | Age: 82
End: 2017-11-28

## 2017-11-28 VITALS
HEART RATE: 111 BPM | BODY MASS INDEX: 32.1 KG/M2 | DIASTOLIC BLOOD PRESSURE: 87 MMHG | HEIGHT: 61 IN | WEIGHT: 170 LBS | SYSTOLIC BLOOD PRESSURE: 153 MMHG

## 2017-11-28 DIAGNOSIS — I10 ESSENTIAL HYPERTENSION: Primary | ICD-10-CM

## 2017-11-28 DIAGNOSIS — E78.00 HYPERCHOLESTEROLEMIA: ICD-10-CM

## 2017-11-28 PROCEDURE — 99215 OFFICE O/P EST HI 40 MIN: CPT | Performed by: INTERNAL MEDICINE

## 2017-11-28 PROCEDURE — G0463 HOSPITAL OUTPT CLINIC VISIT: HCPCS | Performed by: INTERNAL MEDICINE

## 2017-11-29 RX ORDER — ATORVASTATIN CALCIUM 40 MG/1
TABLET, FILM COATED ORAL
Qty: 30 TABLET | Refills: 5 | Status: SHIPPED | OUTPATIENT
Start: 2017-11-29 | End: 2018-01-01

## 2017-11-29 NOTE — TELEPHONE ENCOUNTER
Pt seen yesterday, see scanned labs.   [address] please advise on refill request.      Cholesterol Medications  Protocol Criteria:  · Appointment scheduled in the past 12 months or in the next 3 months  · ALT & LDL on file in the past 12 months  · ALT result < 80

## 2017-11-29 NOTE — PROGRESS NOTES
[facility], [facility]  Nephrology Daily Progress Note    Bijan Postin  [id number]  80year old  Patient presents with:   Annual      HPI:   Bivins Postin is a 80year old   42-year-old female with a history of hypertension, peptic ulcer disease, chronic lo wheezing      PHYSICAL EXAM:     Vitals History 3/22/2017 5/25/2017 11/28/2017   /81 159/83 153/87   BP Location Left arm - -   Pulse 70 69 111   Resp 18 - -   Temp 98.3 - -   SpO2 99 - -   Weight - - 170 lbs   Height - - 61.000   BODY MASS INDEX - - 60 g, Rfl: 2  •  hydrocodone-acetaminophen (NORCO) 7.5-325 MG Oral Tab, Take 1 tablet by mouth every 6 (six) hours as needed. , Disp: , Rfl:   •  Metoprolol Tartrate 100 MG Oral Tab, Take 1 tablet (100 mg total) by mouth 2 (two) times daily. , Disp: 60 table was 140/82. Mentally she seems to be doing well. She feels she is getting to the point where she can probably no longer come to the office. The home health nurse apparently did set her up with a physician who comes to the house.   She really has no jose

## 2017-12-04 ENCOUNTER — TELEPHONE (OUTPATIENT)
Dept: CARDIOLOGY CLINIC | Facility: CLINIC | Age: 82
End: 2017-12-04

## 2017-12-04 RX ORDER — METOPROLOL TARTRATE 100 MG/1
100 TABLET ORAL 2 TIMES DAILY
Qty: 60 TABLET | Refills: 0 | OUTPATIENT
Start: 2017-12-04

## 2017-12-04 NOTE — TELEPHONE ENCOUNTER
Reviewed with APN. Pt last office visit 03/2016 with Cardio. Pt needs to be seen and is refusing. Will refer refills to PCP as was seen 11/28/17.

## 2017-12-04 NOTE — TELEPHONE ENCOUNTER
Pt requesting refills for Metoprolol. Pt refuses to schedule next avail appt with MB in January. PLs call. Thank you. Current Outpatient Prescriptions:  Metoprolol Tartrate 100 MG Oral Tab Take 1 tablet (100 mg total) by mouth 2 (two) times daily.

## 2017-12-05 RX ORDER — METOPROLOL TARTRATE 100 MG/1
100 TABLET ORAL 2 TIMES DAILY
Qty: 60 TABLET | Refills: 5 | Status: SHIPPED | OUTPATIENT
Start: 2017-12-05 | End: 2018-01-01

## 2017-12-05 NOTE — TELEPHONE ENCOUNTER
Advised pt to make appt. Pt states her leg is hurting. Advised pt to call PCP until she can come in to be seen. Pt verbalized understanding.

## 2017-12-12 ENCOUNTER — TELEPHONE (OUTPATIENT)
Dept: OBGYN CLINIC | Facility: CLINIC | Age: 82
End: 2017-12-12

## 2017-12-12 NOTE — TELEPHONE ENCOUNTER
Pt states she cancelled her pessary cleaning today d/t not feeling well but she is wondering if it's ok to keep it in 5 months between cleanings. She r/s to January. Pt states it is not bothering her at all.  Pt also reports a little irritation on the labia

## 2017-12-12 NOTE — TELEPHONE ENCOUNTER
Informed pt that Hermilo Reece [id number] stated she needs to see her regarding her vulvar irritation. Informed pt that pressary cleaning preferably should be q 3-4 months. Transferred pt to [facility] to schedule her appts.

## 2017-12-16 NOTE — TELEPHONE ENCOUNTER
Current Outpatient Prescriptions:  RALOXIFENE HCL 60 MG Oral Tab TAKE 1 TABLET (60 MG TOTAL) BY MOUTH ONCE DAILY.  Disp: 30 tablet Rfl: 0     Refill

## 2017-12-18 RX ORDER — RALOXIFENE HYDROCHLORIDE 60 MG/1
60 TABLET, FILM COATED ORAL
Qty: 30 TABLET | Refills: 5 | Status: SHIPPED | OUTPATIENT
Start: 2017-12-18

## 2017-12-21 ENCOUNTER — TELEPHONE (OUTPATIENT)
Dept: OBGYN CLINIC | Facility: CLINIC | Age: 82
End: 2017-12-21

## 2017-12-21 NOTE — TELEPHONE ENCOUNTER
Per pt has an appt for pessary cleaning and vaginal irritation today but has an upset stomach. Pt wondering if she can get rescheduled for tomorrow. But no openings. Please advise.

## 2017-12-22 ENCOUNTER — OFFICE VISIT (OUTPATIENT)
Dept: OBGYN CLINIC | Facility: CLINIC | Age: 82
End: 2017-12-22

## 2017-12-22 VITALS — HEART RATE: 90 BPM | SYSTOLIC BLOOD PRESSURE: 183 MMHG | DIASTOLIC BLOOD PRESSURE: 113 MMHG

## 2017-12-22 DIAGNOSIS — N81.11 CYSTOCELE, MIDLINE: Primary | ICD-10-CM

## 2017-12-22 PROCEDURE — 99213 OFFICE O/P EST LOW 20 MIN: CPT | Performed by: OBSTETRICS & GYNECOLOGY

## 2017-12-22 RX ORDER — NYSTATIN 100000 [USP'U]/G
POWDER TOPICAL
Qty: 45 G | Refills: 1 | Status: SHIPPED | OUTPATIENT
Start: 2017-12-22 | End: 2017-12-28

## 2017-12-22 NOTE — PROGRESS NOTES
Pessary Check     Louann Singh [id number]  here for pessary check. Last seen 5/25/17. Doing well with pessary. Has good support. Having itching and irritation in groin and left labia.   No discharge or bleeding    Pessary type:  Ring with support pe

## 2017-12-28 ENCOUNTER — TELEPHONE (OUTPATIENT)
Dept: OBGYN CLINIC | Facility: CLINIC | Age: 82
End: 2017-12-28

## 2017-12-28 RX ORDER — NYSTATIN 100000 U/G
1 OINTMENT TOPICAL 2 TIMES DAILY
Qty: 30 G | Refills: 0 | Status: SHIPPED | OUTPATIENT
Start: 2017-12-28 | End: 2018-01-01 | Stop reason: ALTCHOICE

## 2017-12-28 NOTE — TELEPHONE ENCOUNTER
Pt. States that she is having Vaginal irritation and soreness in inner area. Pt is not sure what to use for the irritation?

## 2017-12-28 NOTE — TELEPHONE ENCOUNTER
Pt calling to report that she saw Brigido Gonzalez on 12/22 for pessary cleaning but also had c/o inflammation/itching near her groin area and on her labia.  Pt stated she was prescribed nystatin, but cant remember if she can apply this to groin and labia, or just the g

## 2017-12-28 NOTE — TELEPHONE ENCOUNTER
I reviewed chart and she was prescribed powder in 2016 and recently. She asked about a cream for labial area. I would recommend ointment and Rx sent.

## 2018-01-01 ENCOUNTER — PATIENT OUTREACH (OUTPATIENT)
Dept: CASE MANAGEMENT | Age: 83
End: 2018-01-01

## 2018-01-01 ENCOUNTER — TELEPHONE (OUTPATIENT)
Dept: NEPHROLOGY | Facility: CLINIC | Age: 83
End: 2018-01-01

## 2018-01-01 ENCOUNTER — APPOINTMENT (OUTPATIENT)
Dept: ULTRASOUND IMAGING | Facility: HOSPITAL | Age: 83
DRG: 300 | End: 2018-01-01
Attending: EMERGENCY MEDICINE
Payer: MEDICARE

## 2018-01-01 ENCOUNTER — SNF VISIT (OUTPATIENT)
Dept: INTERNAL MEDICINE CLINIC | Facility: SKILLED NURSING FACILITY | Age: 83
End: 2018-01-01

## 2018-01-01 ENCOUNTER — OFFICE VISIT (OUTPATIENT)
Dept: NEPHROLOGY | Facility: CLINIC | Age: 83
End: 2018-01-01
Payer: MEDICARE

## 2018-01-01 ENCOUNTER — APPOINTMENT (OUTPATIENT)
Dept: GENERAL RADIOLOGY | Facility: HOSPITAL | Age: 83
End: 2018-01-01
Attending: EMERGENCY MEDICINE
Payer: MEDICARE

## 2018-01-01 ENCOUNTER — TELEPHONE (OUTPATIENT)
Dept: OBGYN CLINIC | Facility: CLINIC | Age: 83
End: 2018-01-01

## 2018-01-01 ENCOUNTER — HOSPITAL ENCOUNTER (EMERGENCY)
Facility: HOSPITAL | Age: 83
Discharge: HOME | End: 2018-01-01
Attending: EMERGENCY MEDICINE
Payer: COMMERCIAL

## 2018-01-01 ENCOUNTER — HOSPITAL ENCOUNTER (INPATIENT)
Facility: HOSPITAL | Age: 83
LOS: 3 days | Discharge: NURSING, REHAB OR LONG-TERM CARE | DRG: 300 | End: 2018-01-01
Attending: EMERGENCY MEDICINE | Admitting: HOSPITALIST
Payer: MEDICARE

## 2018-01-01 ENCOUNTER — HOSPITAL ENCOUNTER (EMERGENCY)
Facility: HOSPITAL | Age: 83
Discharge: HOME | End: 2018-01-01
Attending: EMERGENCY MEDICINE
Payer: MEDICARE

## 2018-01-01 ENCOUNTER — HOSPITAL ENCOUNTER (OUTPATIENT)
Facility: HOSPITAL | Age: 83
Setting detail: OBSERVATION
LOS: 1 days | Discharge: HOME HEALTH | End: 2018-01-01
Attending: EMERGENCY MEDICINE | Admitting: HOSPITALIST
Payer: MEDICARE

## 2018-01-01 ENCOUNTER — APPOINTMENT (OUTPATIENT)
Dept: ULTRASOUND IMAGING | Facility: HOSPITAL | Age: 83
End: 2018-01-01
Attending: EMERGENCY MEDICINE
Payer: MEDICARE

## 2018-01-01 VITALS
HEART RATE: 78 BPM | RESPIRATION RATE: 20 BRPM | SYSTOLIC BLOOD PRESSURE: 110 MMHG | TEMPERATURE: 97 F | OXYGEN SATURATION: 95 % | DIASTOLIC BLOOD PRESSURE: 67 MMHG

## 2018-01-01 VITALS
RESPIRATION RATE: 16 BRPM | SYSTOLIC BLOOD PRESSURE: 130 MMHG | BODY MASS INDEX: 28 KG/M2 | OXYGEN SATURATION: 96 % | HEART RATE: 88 BPM | DIASTOLIC BLOOD PRESSURE: 74 MMHG | TEMPERATURE: 97 F | WEIGHT: 150 LBS

## 2018-01-01 VITALS
HEART RATE: 90 BPM | TEMPERATURE: 98 F | DIASTOLIC BLOOD PRESSURE: 61 MMHG | RESPIRATION RATE: 18 BRPM | SYSTOLIC BLOOD PRESSURE: 138 MMHG

## 2018-01-01 VITALS
DIASTOLIC BLOOD PRESSURE: 64 MMHG | HEART RATE: 66 BPM | SYSTOLIC BLOOD PRESSURE: 122 MMHG | RESPIRATION RATE: 18 BRPM | OXYGEN SATURATION: 95 % | TEMPERATURE: 97 F

## 2018-01-01 VITALS
BODY MASS INDEX: 24.74 KG/M2 | HEART RATE: 70 BPM | TEMPERATURE: 98 F | RESPIRATION RATE: 20 BRPM | DIASTOLIC BLOOD PRESSURE: 76 MMHG | HEIGHT: 60 IN | SYSTOLIC BLOOD PRESSURE: 135 MMHG | OXYGEN SATURATION: 90 % | WEIGHT: 126 LBS

## 2018-01-01 VITALS
HEART RATE: 74 BPM | RESPIRATION RATE: 17 BRPM | BODY MASS INDEX: 26.4 KG/M2 | HEIGHT: 61 IN | TEMPERATURE: 98 F | WEIGHT: 139.81 LBS | DIASTOLIC BLOOD PRESSURE: 89 MMHG | SYSTOLIC BLOOD PRESSURE: 150 MMHG | OXYGEN SATURATION: 94 %

## 2018-01-01 VITALS
WEIGHT: 150 LBS | BODY MASS INDEX: 29 KG/M2 | DIASTOLIC BLOOD PRESSURE: 70 MMHG | HEART RATE: 81 BPM | OXYGEN SATURATION: 92 % | SYSTOLIC BLOOD PRESSURE: 151 MMHG | RESPIRATION RATE: 16 BRPM | TEMPERATURE: 98 F

## 2018-01-01 VITALS
DIASTOLIC BLOOD PRESSURE: 67 MMHG | HEART RATE: 80 BPM | RESPIRATION RATE: 18 BRPM | TEMPERATURE: 97 F | SYSTOLIC BLOOD PRESSURE: 154 MMHG

## 2018-01-01 VITALS
TEMPERATURE: 98 F | HEART RATE: 78 BPM | DIASTOLIC BLOOD PRESSURE: 78 MMHG | RESPIRATION RATE: 16 BRPM | SYSTOLIC BLOOD PRESSURE: 138 MMHG

## 2018-01-01 VITALS — SYSTOLIC BLOOD PRESSURE: 158 MMHG | HEART RATE: 69 BPM | DIASTOLIC BLOOD PRESSURE: 83 MMHG | HEIGHT: 62 IN

## 2018-01-01 VITALS
SYSTOLIC BLOOD PRESSURE: 169 MMHG | RESPIRATION RATE: 18 BRPM | HEART RATE: 85 BPM | TEMPERATURE: 97 F | DIASTOLIC BLOOD PRESSURE: 78 MMHG | OXYGEN SATURATION: 95 %

## 2018-01-01 VITALS
DIASTOLIC BLOOD PRESSURE: 59 MMHG | RESPIRATION RATE: 16 BRPM | HEART RATE: 62 BPM | TEMPERATURE: 97 F | SYSTOLIC BLOOD PRESSURE: 122 MMHG | OXYGEN SATURATION: 95 %

## 2018-01-01 VITALS
DIASTOLIC BLOOD PRESSURE: 72 MMHG | HEART RATE: 64 BPM | OXYGEN SATURATION: 95 % | TEMPERATURE: 97 F | SYSTOLIC BLOOD PRESSURE: 118 MMHG | RESPIRATION RATE: 18 BRPM

## 2018-01-01 VITALS
HEART RATE: 75 BPM | SYSTOLIC BLOOD PRESSURE: 136 MMHG | DIASTOLIC BLOOD PRESSURE: 88 MMHG | OXYGEN SATURATION: 97 % | RESPIRATION RATE: 14 BRPM

## 2018-01-01 VITALS
TEMPERATURE: 97 F | OXYGEN SATURATION: 90 % | HEART RATE: 70 BPM | RESPIRATION RATE: 18 BRPM | SYSTOLIC BLOOD PRESSURE: 132 MMHG | DIASTOLIC BLOOD PRESSURE: 64 MMHG

## 2018-01-01 DIAGNOSIS — I10 ESSENTIAL HYPERTENSION: ICD-10-CM

## 2018-01-01 DIAGNOSIS — R33.9 URINARY RETENTION: Primary | ICD-10-CM

## 2018-01-01 DIAGNOSIS — R53.1 WEAKNESS: Primary | ICD-10-CM

## 2018-01-01 DIAGNOSIS — R53.1 WEAKNESS: ICD-10-CM

## 2018-01-01 DIAGNOSIS — B37.9 CANDIDA ALBICANS INFECTION: ICD-10-CM

## 2018-01-01 DIAGNOSIS — N39.0 URINARY TRACT INFECTION WITH HEMATURIA, SITE UNSPECIFIED: ICD-10-CM

## 2018-01-01 DIAGNOSIS — N30.00 ACUTE CYSTITIS WITHOUT HEMATURIA: ICD-10-CM

## 2018-01-01 DIAGNOSIS — T83.011A MALFUNCTION OF FOLEY CATHETER, INITIAL ENCOUNTER (HCC): ICD-10-CM

## 2018-01-01 DIAGNOSIS — Z02.9 ENCOUNTERS FOR ADMINISTRATIVE PURPOSE: ICD-10-CM

## 2018-01-01 DIAGNOSIS — R93.89 INFILTRATE NOTED ON IMAGING STUDY: ICD-10-CM

## 2018-01-01 DIAGNOSIS — I82.412 DVT OF DEEP FEMORAL VEIN, LEFT (HCC): ICD-10-CM

## 2018-01-01 DIAGNOSIS — E78.00 HYPERCHOLESTEROLEMIA: ICD-10-CM

## 2018-01-01 DIAGNOSIS — I10 ESSENTIAL HYPERTENSION: Primary | ICD-10-CM

## 2018-01-01 DIAGNOSIS — E87.5 HYPERKALEMIA: Primary | ICD-10-CM

## 2018-01-01 DIAGNOSIS — E87.5 HYPERKALEMIA: ICD-10-CM

## 2018-01-01 DIAGNOSIS — R31.9 URINARY TRACT INFECTION WITH HEMATURIA, SITE UNSPECIFIED: ICD-10-CM

## 2018-01-01 DIAGNOSIS — R33.9 URINARY RETENTION: ICD-10-CM

## 2018-01-01 DIAGNOSIS — N39.0 URINARY TRACT INFECTION WITHOUT HEMATURIA, SITE UNSPECIFIED: ICD-10-CM

## 2018-01-01 DIAGNOSIS — I82.412 DVT OF DEEP FEMORAL VEIN, LEFT (HCC): Primary | ICD-10-CM

## 2018-01-01 DIAGNOSIS — N39.0 URINARY TRACT INFECTION WITHOUT HEMATURIA, SITE UNSPECIFIED: Primary | ICD-10-CM

## 2018-01-01 DIAGNOSIS — R60.1 GENERALIZED EDEMA: ICD-10-CM

## 2018-01-01 DIAGNOSIS — N30.00 ACUTE CYSTITIS WITHOUT HEMATURIA: Primary | ICD-10-CM

## 2018-01-01 LAB
ANION GAP SERPL CALC-SCNC: 10 MMOL/L (ref 0–18)
ANION GAP SERPL CALC-SCNC: 7 MMOL/L (ref 0–18)
ANION GAP SERPL CALC-SCNC: 8 MMOL/L (ref 0–18)
APTT PPP: 108 SECONDS (ref 23.2–35.3)
APTT PPP: 236.8 SECONDS (ref 23.2–35.3)
APTT PPP: 28.6 SECONDS (ref 23.2–35.3)
APTT PPP: 30.8 SECONDS (ref 23.2–35.3)
APTT PPP: 70.8 SECONDS (ref 23.2–35.3)
APTT PPP: 90.5 SECONDS (ref 23.2–35.3)
BASOPHILS # BLD: 0.1 K/UL (ref 0–0.2)
BASOPHILS NFR BLD: 1 %
BILIRUB UR QL: NEGATIVE
BUN SERPL-MCNC: 7 MG/DL (ref 8–20)
BUN SERPL-MCNC: 8 MG/DL (ref 8–20)
BUN SERPL-MCNC: 9 MG/DL (ref 8–20)
BUN/CREAT SERPL: 11.3 (ref 10–20)
BUN/CREAT SERPL: 12.9 (ref 10–20)
BUN/CREAT SERPL: 13.6 (ref 10–20)
CALCIUM SERPL-MCNC: 8.6 MG/DL (ref 8.5–10.5)
CALCIUM SERPL-MCNC: 8.6 MG/DL (ref 8.5–10.5)
CALCIUM SERPL-MCNC: 9.1 MG/DL (ref 8.5–10.5)
CHLORIDE SERPL-SCNC: 100 MMOL/L (ref 95–110)
CHLORIDE SERPL-SCNC: 101 MMOL/L (ref 95–110)
CHLORIDE SERPL-SCNC: 102 MMOL/L (ref 95–110)
CLARITY UR: CLEAR
CLARITY UR: CLEAR
CO2 SERPL-SCNC: 24 MMOL/L (ref 22–32)
CO2 SERPL-SCNC: 25 MMOL/L (ref 22–32)
CO2 SERPL-SCNC: 26 MMOL/L (ref 22–32)
COLOR UR: YELLOW
CREAT SERPL-MCNC: 0.62 MG/DL (ref 0.5–1.5)
CREAT SERPL-MCNC: 0.62 MG/DL (ref 0.5–1.5)
CREAT SERPL-MCNC: 0.66 MG/DL (ref 0.5–1.5)
EOSINOPHIL # BLD: 0.1 K/UL (ref 0–0.7)
EOSINOPHIL # BLD: 0.2 K/UL (ref 0–0.7)
EOSINOPHIL # BLD: 0.3 K/UL (ref 0–0.7)
EOSINOPHIL NFR BLD: 1 %
EOSINOPHIL NFR BLD: 2 %
EOSINOPHIL NFR BLD: 3 %
ERYTHROCYTE [DISTWIDTH] IN BLOOD BY AUTOMATED COUNT: 14.1 % (ref 11–15)
ERYTHROCYTE [DISTWIDTH] IN BLOOD BY AUTOMATED COUNT: 14.6 % (ref 11–15)
ERYTHROCYTE [DISTWIDTH] IN BLOOD BY AUTOMATED COUNT: 14.6 % (ref 11–15)
GLUCOSE SERPL-MCNC: 101 MG/DL (ref 70–99)
GLUCOSE SERPL-MCNC: 115 MG/DL (ref 70–99)
GLUCOSE SERPL-MCNC: 121 MG/DL (ref 70–99)
GLUCOSE UR-MCNC: NEGATIVE MG/DL
HCT VFR BLD AUTO: 40.5 % (ref 35–48)
HCT VFR BLD AUTO: 40.8 % (ref 35–48)
HCT VFR BLD AUTO: 44.7 % (ref 35–48)
HGB BLD-MCNC: 13.3 G/DL (ref 12–16)
HGB BLD-MCNC: 13.4 G/DL (ref 12–16)
HGB BLD-MCNC: 14.7 G/DL (ref 12–16)
HGB UR QL STRIP.AUTO: NEGATIVE
HGB UR QL STRIP.AUTO: NEGATIVE
KETONES UR-MCNC: NEGATIVE MG/DL
KETONES UR-MCNC: NEGATIVE MG/DL
LEUKOCYTE ESTERASE UR QL STRIP.AUTO: NEGATIVE
LYMPHOCYTES # BLD: 1.5 K/UL (ref 1–4)
LYMPHOCYTES # BLD: 2.6 K/UL (ref 1–4)
LYMPHOCYTES # BLD: 2.8 K/UL (ref 1–4)
LYMPHOCYTES NFR BLD: 15 %
LYMPHOCYTES NFR BLD: 28 %
LYMPHOCYTES NFR BLD: 29 %
MCH RBC QN AUTO: 31.4 PG (ref 27–32)
MCH RBC QN AUTO: 31.7 PG (ref 27–32)
MCH RBC QN AUTO: 32 PG (ref 27–32)
MCHC RBC AUTO-ENTMCNC: 32.7 G/DL (ref 32–37)
MCHC RBC AUTO-ENTMCNC: 32.8 G/DL (ref 32–37)
MCHC RBC AUTO-ENTMCNC: 33 G/DL (ref 32–37)
MCV RBC AUTO: 96.2 FL (ref 80–100)
MCV RBC AUTO: 96.5 FL (ref 80–100)
MCV RBC AUTO: 96.9 FL (ref 80–100)
MONOCYTES # BLD: 0.9 K/UL (ref 0–1)
MONOCYTES # BLD: 1 K/UL (ref 0–1)
MONOCYTES # BLD: 1.1 K/UL (ref 0–1)
MONOCYTES NFR BLD: 11 %
MONOCYTES NFR BLD: 11 %
MONOCYTES NFR BLD: 9 %
NEUTROPHILS # BLD AUTO: 5.6 K/UL (ref 1.8–7.7)
NEUTROPHILS # BLD AUTO: 5.6 K/UL (ref 1.8–7.7)
NEUTROPHILS # BLD AUTO: 7.5 K/UL (ref 1.8–7.7)
NEUTROPHILS NFR BLD: 57 %
NEUTROPHILS NFR BLD: 59 %
NEUTROPHILS NFR BLD: 75 %
NITRITE UR QL STRIP.AUTO: NEGATIVE
OSMOLALITY UR CALC.SUM OF ELEC: 275 MOSM/KG (ref 275–295)
OSMOLALITY UR CALC.SUM OF ELEC: 276 MOSM/KG (ref 275–295)
OSMOLALITY UR CALC.SUM OF ELEC: 282 MOSM/KG (ref 275–295)
PH UR: 5 [PH] (ref 5–8)
PH UR: 6 [PH] (ref 5–8)
PH UR: 6 [PH] (ref 5–8)
PLATELET # BLD AUTO: 301 K/UL (ref 140–400)
PLATELET # BLD AUTO: 407 K/UL (ref 140–400)
PLATELET # BLD AUTO: 444 K/UL (ref 140–400)
PMV BLD AUTO: 10.1 FL (ref 7.4–10.3)
PMV BLD AUTO: 7.6 FL (ref 7.4–10.3)
PMV BLD AUTO: 7.6 FL (ref 7.4–10.3)
POTASSIUM SERPL-SCNC: 3.6 MMOL/L (ref 3.3–5.1)
POTASSIUM SERPL-SCNC: 3.9 MMOL/L (ref 3.3–5.1)
POTASSIUM SERPL-SCNC: 4.1 MMOL/L (ref 3.3–5.1)
POTASSIUM SERPL-SCNC: 4.1 MMOL/L (ref 3.3–5.1)
PROT UR-MCNC: NEGATIVE MG/DL
RBC # BLD AUTO: 4.18 M/UL (ref 3.7–5.4)
RBC # BLD AUTO: 4.24 M/UL (ref 3.7–5.4)
RBC # BLD AUTO: 4.63 M/UL (ref 3.7–5.4)
RBC #/AREA URNS AUTO: 1 /HPF
RBC #/AREA URNS AUTO: <1 /HPF
SODIUM SERPL-SCNC: 133 MMOL/L (ref 136–144)
SODIUM SERPL-SCNC: 134 MMOL/L (ref 136–144)
SODIUM SERPL-SCNC: 136 MMOL/L (ref 136–144)
SP GR UR STRIP: 1.01 (ref 1–1.03)
UROBILINOGEN UR STRIP-ACNC: <2
VIT C UR-MCNC: NEGATIVE MG/DL
WBC # BLD AUTO: 10 K/UL (ref 4–11)
WBC # BLD AUTO: 9.6 K/UL (ref 4–11)
WBC # BLD AUTO: 9.9 K/UL (ref 4–11)
WBC #/AREA URNS AUTO: 2 /HPF
WBC #/AREA URNS AUTO: 4 /HPF

## 2018-01-01 PROCEDURE — 99308 SBSQ NF CARE LOW MDM 20: CPT | Performed by: NURSE PRACTITIONER

## 2018-01-01 PROCEDURE — 71045 X-RAY EXAM CHEST 1 VIEW: CPT | Performed by: EMERGENCY MEDICINE

## 2018-01-01 PROCEDURE — 99233 SBSQ HOSP IP/OBS HIGH 50: CPT | Performed by: HOSPITALIST

## 2018-01-01 PROCEDURE — 99217 OBSERVATION CARE DISCHARGE: CPT | Performed by: HOSPITALIST

## 2018-01-01 PROCEDURE — 99215 OFFICE O/P EST HI 40 MIN: CPT | Performed by: INTERNAL MEDICINE

## 2018-01-01 PROCEDURE — 99220 INITIAL OBSERVATION CARE,LEVL III: CPT | Performed by: HOSPITALIST

## 2018-01-01 PROCEDURE — 93971 EXTREMITY STUDY: CPT | Performed by: EMERGENCY MEDICINE

## 2018-01-01 PROCEDURE — 99223 1ST HOSP IP/OBS HIGH 75: CPT | Performed by: HOSPITALIST

## 2018-01-01 PROCEDURE — 99309 SBSQ NF CARE MODERATE MDM 30: CPT | Performed by: NURSE PRACTITIONER

## 2018-01-01 PROCEDURE — 81001 URINALYSIS AUTO W/SCOPE: CPT | Performed by: EMERGENCY MEDICINE

## 2018-01-01 PROCEDURE — 99239 HOSP IP/OBS DSCHRG MGMT >30: CPT | Performed by: HOSPITALIST

## 2018-01-01 PROCEDURE — 99310 SBSQ NF CARE HIGH MDM 45: CPT | Performed by: NURSE PRACTITIONER

## 2018-01-01 PROCEDURE — 1111F DSCHRG MED/CURRENT MED MERGE: CPT

## 2018-01-01 PROCEDURE — 99283 EMERGENCY DEPT VISIT LOW MDM: CPT

## 2018-01-01 PROCEDURE — 87086 URINE CULTURE/COLONY COUNT: CPT | Performed by: EMERGENCY MEDICINE

## 2018-01-01 PROCEDURE — 87077 CULTURE AEROBIC IDENTIFY: CPT | Performed by: EMERGENCY MEDICINE

## 2018-01-01 PROCEDURE — G0463 HOSPITAL OUTPT CLINIC VISIT: HCPCS | Performed by: INTERNAL MEDICINE

## 2018-01-01 PROCEDURE — 99226 SUBSEQUENT OBSERVATION CARE: CPT | Performed by: HOSPITALIST

## 2018-01-01 PROCEDURE — 87186 SC STD MICRODIL/AGAR DIL: CPT | Performed by: EMERGENCY MEDICINE

## 2018-01-01 RX ORDER — CEPHALEXIN 500 MG/1
500 CAPSULE ORAL 4 TIMES DAILY
Qty: 32 CAPSULE | Refills: 0 | Status: SHIPPED | OUTPATIENT
Start: 2018-01-01 | End: 2018-01-01

## 2018-01-01 RX ORDER — MEPROBAMATE 400 MG/1
400 TABLET ORAL 2 TIMES DAILY PRN
Status: DISCONTINUED | OUTPATIENT
Start: 2018-01-01 | End: 2018-01-01

## 2018-01-01 RX ORDER — METOPROLOL TARTRATE 100 MG/1
100 TABLET ORAL 2 TIMES DAILY
Qty: 60 TABLET | Refills: 1 | OUTPATIENT
Start: 2018-01-01

## 2018-01-01 RX ORDER — CEPHALEXIN 500 MG/1
500 CAPSULE ORAL ONCE
Status: COMPLETED | OUTPATIENT
Start: 2018-01-01 | End: 2018-01-01

## 2018-01-01 RX ORDER — MEPROBAMATE 400 MG/1
TABLET ORAL
Qty: 60 TABLET | Refills: 0 | Status: SHIPPED | OUTPATIENT
Start: 2018-01-01 | End: 2018-01-01

## 2018-01-01 RX ORDER — ONDANSETRON 2 MG/ML
4 INJECTION INTRAMUSCULAR; INTRAVENOUS EVERY 6 HOURS PRN
Status: DISCONTINUED | OUTPATIENT
Start: 2018-01-01 | End: 2018-01-01

## 2018-01-01 RX ORDER — SENNA AND DOCUSATE SODIUM 50; 8.6 MG/1; MG/1
2 TABLET, FILM COATED ORAL DAILY
Status: ON HOLD | COMMUNITY
End: 2018-01-01

## 2018-01-01 RX ORDER — SODIUM CHLORIDE 9 MG/ML
INJECTION, SOLUTION INTRAVENOUS
Status: COMPLETED
Start: 2018-01-01 | End: 2018-01-01

## 2018-01-01 RX ORDER — MEPROBAMATE 400 MG/1
TABLET ORAL
Qty: 60 TABLET | Refills: 0 | OUTPATIENT
Start: 2018-01-01

## 2018-01-01 RX ORDER — MEPROBAMATE 400 MG/1
TABLET ORAL
Qty: 30 TABLET | Refills: 0 | Status: SHIPPED | OUTPATIENT
Start: 2018-01-01 | End: 2018-01-01

## 2018-01-01 RX ORDER — OXYBUTYNIN CHLORIDE 5 MG/1
5 TABLET ORAL 2 TIMES DAILY
Status: DISCONTINUED | OUTPATIENT
Start: 2018-01-01 | End: 2018-01-01

## 2018-01-01 RX ORDER — METOPROLOL TARTRATE 100 MG/1
100 TABLET ORAL 2 TIMES DAILY
Status: DISCONTINUED | OUTPATIENT
Start: 2018-01-01 | End: 2018-01-01

## 2018-01-01 RX ORDER — FUROSEMIDE 20 MG/1
20 TABLET ORAL AS NEEDED
COMMUNITY

## 2018-01-01 RX ORDER — POTASSIUM CHLORIDE 20 MEQ/1
40 TABLET, EXTENDED RELEASE ORAL EVERY 4 HOURS
Status: COMPLETED | OUTPATIENT
Start: 2018-01-01 | End: 2018-01-01

## 2018-01-01 RX ORDER — BISACODYL 10 MG
10 SUPPOSITORY, RECTAL RECTAL
Status: DISCONTINUED | OUTPATIENT
Start: 2018-01-01 | End: 2018-01-01

## 2018-01-01 RX ORDER — MEPROBAMATE 400 MG/1
TABLET ORAL
Qty: 30 TABLET | Refills: 0 | OUTPATIENT
Start: 2018-01-01

## 2018-01-01 RX ORDER — ATORVASTATIN CALCIUM 40 MG/1
TABLET, FILM COATED ORAL
Qty: 30 TABLET | Refills: 0 | Status: SHIPPED | OUTPATIENT
Start: 2018-01-01 | End: 2018-01-01

## 2018-01-01 RX ORDER — PANTOPRAZOLE SODIUM 40 MG/1
40 TABLET, DELAYED RELEASE ORAL
COMMUNITY

## 2018-01-01 RX ORDER — ACETAMINOPHEN 325 MG/1
650 TABLET ORAL EVERY 6 HOURS PRN
Status: DISCONTINUED | OUTPATIENT
Start: 2018-01-01 | End: 2018-01-01

## 2018-01-01 RX ORDER — HEPARIN SODIUM AND DEXTROSE 10000; 5 [USP'U]/100ML; G/100ML
18 INJECTION INTRAVENOUS ONCE
Status: COMPLETED | OUTPATIENT
Start: 2018-01-01 | End: 2018-01-01

## 2018-01-01 RX ORDER — MEPROBAMATE 400 MG/1
TABLET ORAL
Qty: 30 TABLET | Refills: 1 | Status: CANCELLED | OUTPATIENT
Start: 2018-01-01

## 2018-01-01 RX ORDER — MEPROBAMATE 400 MG/1
TABLET ORAL
Qty: 30 TABLET | Refills: 0 | Status: ON HOLD | OUTPATIENT
Start: 2018-01-01 | End: 2018-01-01

## 2018-01-01 RX ORDER — METOPROLOL TARTRATE 100 MG/1
100 TABLET ORAL
Status: DISCONTINUED | OUTPATIENT
Start: 2018-01-01 | End: 2018-01-01

## 2018-01-01 RX ORDER — METOPROLOL TARTRATE 100 MG/1
100 TABLET ORAL 2 TIMES DAILY
Qty: 60 TABLET | Refills: 0 | Status: SHIPPED | OUTPATIENT
Start: 2018-01-01 | End: 2019-01-01

## 2018-01-01 RX ORDER — CELECOXIB 100 MG/1
100 CAPSULE ORAL 2 TIMES DAILY PRN
Qty: 30 CAPSULE | Refills: 0 | Status: SHIPPED | OUTPATIENT
Start: 2018-01-01

## 2018-01-01 RX ORDER — CEPHALEXIN 500 MG/1
500 CAPSULE ORAL 2 TIMES DAILY
Qty: 14 CAPSULE | Refills: 0 | Status: SHIPPED | OUTPATIENT
Start: 2018-01-01 | End: 2018-01-01

## 2018-01-01 RX ORDER — FLUCONAZOLE 200 MG/1
200 TABLET ORAL DAILY
COMMUNITY
Start: 2018-01-01 | End: 2018-01-01

## 2018-01-01 RX ORDER — MEPROBAMATE 400 MG/1
400 TABLET ORAL 2 TIMES DAILY PRN
Qty: 30 TABLET | Refills: 0 | Status: SHIPPED | OUTPATIENT
Start: 2018-01-01 | End: 2018-01-01

## 2018-01-01 RX ORDER — NITROFURANTOIN 25; 75 MG/1; MG/1
100 CAPSULE ORAL 2 TIMES DAILY
Qty: 14 CAPSULE | Refills: 0 | Status: ON HOLD | OUTPATIENT
Start: 2018-01-01 | End: 2018-01-01

## 2018-01-01 RX ORDER — ATORVASTATIN CALCIUM 40 MG/1
40 TABLET, FILM COATED ORAL NIGHTLY
Status: DISCONTINUED | OUTPATIENT
Start: 2018-01-01 | End: 2018-01-01

## 2018-01-01 RX ORDER — LEVOFLOXACIN 500 MG/1
500 TABLET, FILM COATED ORAL DAILY
COMMUNITY
Start: 2018-01-01 | End: 2018-01-01

## 2018-01-01 RX ORDER — METOPROLOL TARTRATE 100 MG/1
100 TABLET ORAL 2 TIMES DAILY
Qty: 60 TABLET | Refills: 5 | Status: SHIPPED | OUTPATIENT
Start: 2018-01-01 | End: 2018-01-01

## 2018-01-01 RX ORDER — HEPARIN SODIUM 1000 [USP'U]/ML
80 INJECTION, SOLUTION INTRAVENOUS; SUBCUTANEOUS ONCE
Status: COMPLETED | OUTPATIENT
Start: 2018-01-01 | End: 2018-01-01

## 2018-01-01 RX ORDER — SODIUM CHLORIDE 0.9 % (FLUSH) 0.9 %
3 SYRINGE (ML) INJECTION AS NEEDED
Status: DISCONTINUED | OUTPATIENT
Start: 2018-01-01 | End: 2018-01-01

## 2018-01-01 RX ORDER — ATORVASTATIN CALCIUM 40 MG/1
TABLET, FILM COATED ORAL
Qty: 30 TABLET | Refills: 0 | Status: SHIPPED | OUTPATIENT
Start: 2018-01-01

## 2018-01-01 RX ORDER — METOCLOPRAMIDE HYDROCHLORIDE 5 MG/ML
10 INJECTION INTRAMUSCULAR; INTRAVENOUS EVERY 8 HOURS PRN
Status: DISCONTINUED | OUTPATIENT
Start: 2018-01-01 | End: 2018-01-01

## 2018-01-01 RX ORDER — DOCUSATE SODIUM 100 MG/1
100 CAPSULE, LIQUID FILLED ORAL 2 TIMES DAILY
Status: DISCONTINUED | OUTPATIENT
Start: 2018-01-01 | End: 2018-01-01

## 2018-01-01 RX ORDER — ALPRAZOLAM 0.25 MG/1
0.25 TABLET ORAL ONCE
Status: COMPLETED | OUTPATIENT
Start: 2018-01-01 | End: 2018-01-01

## 2018-01-01 RX ORDER — PANTOPRAZOLE SODIUM 40 MG/1
40 TABLET, DELAYED RELEASE ORAL
Status: DISCONTINUED | OUTPATIENT
Start: 2018-01-01 | End: 2018-01-01

## 2018-01-01 RX ORDER — DIAZEPAM 5 MG/1
5 TABLET ORAL EVERY 6 HOURS PRN
Status: DISCONTINUED | OUTPATIENT
Start: 2018-01-01 | End: 2018-01-01

## 2018-01-01 RX ORDER — METOPROLOL TARTRATE 50 MG/1
100 TABLET, FILM COATED ORAL ONCE
Status: COMPLETED | OUTPATIENT
Start: 2018-01-01 | End: 2018-01-01

## 2018-01-01 RX ORDER — ONDANSETRON 2 MG/ML
4 INJECTION INTRAMUSCULAR; INTRAVENOUS EVERY 4 HOURS PRN
Status: DISCONTINUED | OUTPATIENT
Start: 2018-01-01 | End: 2018-01-01

## 2018-01-01 RX ORDER — CELECOXIB 100 MG/1
100 CAPSULE ORAL 2 TIMES DAILY PRN
Status: DISCONTINUED | OUTPATIENT
Start: 2018-01-01 | End: 2018-01-01

## 2018-01-01 RX ORDER — POLYETHYLENE GLYCOL 3350 17 G/17G
17 POWDER, FOR SOLUTION ORAL DAILY PRN
Status: DISCONTINUED | OUTPATIENT
Start: 2018-01-01 | End: 2018-01-01

## 2018-01-01 RX ORDER — SODIUM PHOSPHATE, DIBASIC AND SODIUM PHOSPHATE, MONOBASIC 7; 19 G/133ML; G/133ML
1 ENEMA RECTAL ONCE AS NEEDED
Status: DISCONTINUED | OUTPATIENT
Start: 2018-01-01 | End: 2018-01-01

## 2018-01-01 RX ORDER — ALFUZOSIN HYDROCHLORIDE 10 MG/1
10 TABLET, EXTENDED RELEASE ORAL
Status: DISCONTINUED | OUTPATIENT
Start: 2018-01-01 | End: 2018-01-01

## 2018-01-01 RX ORDER — ACETAMINOPHEN 325 MG/1
650 TABLET ORAL EVERY 6 HOURS PRN
Qty: 30 TABLET | Refills: 0 | Status: ON HOLD | OUTPATIENT
Start: 2018-01-01 | End: 2018-01-01

## 2018-01-01 RX ORDER — MAGNESIUM OXIDE 400 MG (241.3 MG MAGNESIUM) TABLET
400 TABLET ONCE
Status: COMPLETED | OUTPATIENT
Start: 2018-01-01 | End: 2018-01-01

## 2018-01-01 RX ORDER — RALOXIFENE HYDROCHLORIDE 60 MG/1
60 TABLET, FILM COATED ORAL DAILY
Status: DISCONTINUED | OUTPATIENT
Start: 2018-01-01 | End: 2018-01-01

## 2018-01-01 RX ORDER — ONDANSETRON 2 MG/ML
4 INJECTION INTRAMUSCULAR; INTRAVENOUS ONCE
Status: COMPLETED | OUTPATIENT
Start: 2018-01-01 | End: 2018-01-01

## 2018-01-01 RX ORDER — TAMSULOSIN HYDROCHLORIDE 0.4 MG/1
0.4 CAPSULE ORAL DAILY
COMMUNITY

## 2018-01-01 RX ORDER — OXYBUTYNIN CHLORIDE 5 MG/1
5 TABLET ORAL 2 TIMES DAILY
Qty: 30 TABLET | Refills: 0 | Status: SHIPPED | OUTPATIENT
Start: 2018-01-01 | End: 2018-01-01 | Stop reason: ALTCHOICE

## 2018-01-01 RX ORDER — RALOXIFENE HYDROCHLORIDE 60 MG/1
60 TABLET, FILM COATED ORAL
Status: DISCONTINUED | OUTPATIENT
Start: 2018-01-01 | End: 2018-01-01

## 2018-01-01 RX ORDER — CELECOXIB 100 MG/1
100 CAPSULE ORAL 2 TIMES DAILY
Qty: 60 CAPSULE | Refills: 0 | Status: ON HOLD | OUTPATIENT
Start: 2018-01-01 | End: 2018-01-01

## 2018-01-01 RX ORDER — SULFAMETHOXAZOLE AND TRIMETHOPRIM 800; 160 MG/1; MG/1
1 TABLET ORAL 2 TIMES DAILY
Qty: 8 TABLET | Refills: 0 | Status: SHIPPED | OUTPATIENT
Start: 2018-01-01 | End: 2018-01-01

## 2018-01-01 RX ORDER — DIAZEPAM 5 MG/1
5 TABLET ORAL EVERY 6 HOURS PRN
Qty: 20 TABLET | Refills: 0 | Status: SHIPPED | OUTPATIENT
Start: 2018-01-01

## 2018-01-01 RX ORDER — HEPARIN SODIUM AND DEXTROSE 10000; 5 [USP'U]/100ML; G/100ML
INJECTION INTRAVENOUS CONTINUOUS
Status: DISCONTINUED | OUTPATIENT
Start: 2018-01-01 | End: 2018-01-01

## 2018-01-01 RX ORDER — CELECOXIB 200 MG/1
CAPSULE ORAL
Qty: 30 CAPSULE | Refills: 0 | Status: SHIPPED | OUTPATIENT
Start: 2018-01-01 | End: 2018-01-01

## 2018-01-01 RX ORDER — CEPHALEXIN 500 MG/1
500 CAPSULE ORAL 2 TIMES DAILY
COMMUNITY
Start: 2018-01-01 | End: 2018-01-01

## 2018-01-01 RX ORDER — OXYBUTYNIN CHLORIDE 5 MG/1
5 TABLET ORAL 2 TIMES DAILY
COMMUNITY

## 2018-01-01 RX ORDER — SOLIFENACIN SUCCINATE 5 MG/1
5 TABLET, FILM COATED ORAL DAILY
Status: ON HOLD | COMMUNITY
End: 2018-01-01

## 2018-01-05 RX ORDER — MEPROBAMATE 400 MG/1
TABLET ORAL
Qty: 60 TABLET | Refills: 0 | Status: SHIPPED | OUTPATIENT
Start: 2018-01-05 | End: 2018-02-05

## 2018-01-08 RX ORDER — MEPROBAMATE 400 MG/1
TABLET ORAL
Qty: 60 TABLET | Refills: 0 | OUTPATIENT
Start: 2018-01-08

## 2018-01-08 NOTE — TELEPHONE ENCOUNTER
Rx was faxed on 1/5/18. Contacted [facility] and they say they didn't receive anything. Verbal auth given. Contacted pt and notified her that it was called in since they didn't receive our fax on 1/5/18.

## 2018-01-08 NOTE — TELEPHONE ENCOUNTER
This med was approved on 1/5- pharm still does not have rx - pt asking to  today - pls call - see other comm

## 2018-01-08 NOTE — TELEPHONE ENCOUNTER
Pt states she has not taken medication the last week & is just following up. Pls call once rx has sent to pharm. Thank you.

## 2018-01-24 ENCOUNTER — TELEPHONE (OUTPATIENT)
Dept: DERMATOLOGY CLINIC | Facility: CLINIC | Age: 83
End: 2018-01-24

## 2018-01-24 NOTE — TELEPHONE ENCOUNTER
LOV 12/6/16, pt with itchy, crusty lesion to right shoulder blade x6-8 weeks. States she \"can't stand it anymore\" and is requesting to be seen tomorrow afternoon if possible.  States because of her age she is in bed most of the morning (she's 80) please a

## 2018-01-24 NOTE — TELEPHONE ENCOUNTER
Pt states crusty spot on shoulder blade. Has been burning. Would like to be seen tomorrow.  Please call

## 2018-01-25 ENCOUNTER — OFFICE VISIT (OUTPATIENT)
Dept: DERMATOLOGY CLINIC | Facility: CLINIC | Age: 83
End: 2018-01-25

## 2018-01-25 DIAGNOSIS — L82.0 INFLAMED SEBORRHEIC KERATOSIS: Primary | ICD-10-CM

## 2018-01-25 PROCEDURE — 17110 DESTRUCTION B9 LES UP TO 14: CPT | Performed by: DERMATOLOGY

## 2018-01-25 RX ORDER — CLINDAMYCIN HYDROCHLORIDE 150 MG/1
CAPSULE ORAL
Refills: 0 | COMMUNITY
Start: 2017-12-29 | End: 2018-01-01 | Stop reason: ALTCHOICE

## 2018-01-25 NOTE — PROGRESS NOTES
.  HPI:     Chief Complaint     Lesion        HPI     Lesion    Additional comments: Pt presenting today with crusty itchy lesion on shoulder blade x  6 weeks pt notes \"always bothering her\" pt denies a personal hx of SC.         Last edited by Ariana Stock 30 g Rfl: 2   Econazole Nitrate 1 % External Cream Apply to groin 2x/day along with the hydrocortisone cr Disp: 30 g Rfl: 2   furosemide (LASIX) 20 MG Oral Tab Take 1 tablet my mouth every other day.  Disp: 15 tablet Rfl: 3   Cholecalciferol (D-2000 MAXIMUM Concern Yes    Comment: 2 cups daily soda/coffee    Pt has a pacemaker No    Pt has a defibrillator No    Reaction to local anesthetic No     Social History Narrative   None on file     Family History   Problem Relation Age of Onset   • Diabetes Son
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
## 2018-02-05 RX ORDER — MEPROBAMATE 400 MG/1
TABLET ORAL
Qty: 60 TABLET | Refills: 0 | Status: SHIPPED | OUTPATIENT
Start: 2018-02-05 | End: 2018-03-06

## 2018-03-06 RX ORDER — MEPROBAMATE 400 MG/1
TABLET ORAL
Qty: 60 TABLET | Refills: 0 | Status: SHIPPED | OUTPATIENT
Start: 2018-03-06 | End: 2018-01-01

## 2018-03-22 ENCOUNTER — TELEPHONE (OUTPATIENT)
Dept: OBGYN CLINIC | Facility: CLINIC | Age: 83
End: 2018-03-22

## 2018-03-22 NOTE — TELEPHONE ENCOUNTER
Pt needs a pessary cleaning and wants to be seen sooner than 4/10 in [facility] W/ DR Franklin Joseph

## 2018-04-19 NOTE — TELEPHONE ENCOUNTER
Encounter routed to Dr. Mohan Angulo to advise. No appointments are available on the Friday 4/20 schedule.

## 2018-04-19 NOTE — TELEPHONE ENCOUNTER
Contacted pt. She states she wants to schedule appt with [facility] because the pharmacy told her [facility] refused her meprobamate. Reviewed chart and [facility] approved it on 4/6/18 but denied a script on 4/9/18 because it had already been refilled.  However, I don't see th

## 2018-05-24 NOTE — TELEPHONE ENCOUNTER
Contacted pt. Scheduled follow up with [facility] for 6/27/18. She is in need of a refill on her meprobamate.

## 2018-05-24 NOTE — TELEPHONE ENCOUNTER
Pt states she needs a refill on the following medication Pt is aware she needs to see dr in order to get it. Pt was offered first available 06/26/18 pt states she would need her medication before that. Would like to know if she can be seen sooner then first

## 2018-05-25 NOTE — TELEPHONE ENCOUNTER
CATHY - pt calling to inform RN that she scheduled appt with ANDREY on 6/27/2018. Requesting rx to be forwarded to pharm. PLs call. Thank you.

## 2018-05-31 NOTE — TELEPHONE ENCOUNTER
LOV 3/31/16 not following w/cardiology at [facility] either. Routing to PCP.   Please see refill request

## 2018-06-22 NOTE — TELEPHONE ENCOUNTER
LOV 11/28/17. Upcoming appointment is scheduled for 6/27/18. Refill pended and routed to Dr. Desire Gomez to approve.

## 2018-06-22 NOTE — TELEPHONE ENCOUNTER
Refill pended and routed to Dr. Charis Cool. LOV 11/28/17. Upcoming appointment scheduled for 6/27/18.

## 2018-06-22 NOTE — TELEPHONE ENCOUNTER
Pt calling for update on refill for rx:MEPROBAMATE 400 MG, indicates she contacted pharm rx was not in . 20mins ago, pls call at:[phone],thanks.     Current Outpatient Prescriptions:   •  MEPROBAMATE 400 MG Oral Tab, TAKE 1 TABLET BY MOUTH TWICE A DAY A

## 2018-07-03 NOTE — TELEPHONE ENCOUNTER
Current Outpatient Prescriptions:  ATORVASTATIN 40 MG Oral Tab TAKE 1 TABLET BY MOUTH NIGHTLY.  Disp: 30 tablet Rfl: 5     Refill

## 2018-07-05 NOTE — TELEPHONE ENCOUNTER
LOV 11/28/17. Last lipid panel was done on 2/4/16. There are several open orders for Lipid panels in the chart. Will print orders and mail to patient.

## 2018-07-05 NOTE — TELEPHONE ENCOUNTER
Left detailed message on vm to do lab work if possible before appointment on 7/18/18. Fast 12 hours. Advised to call back with any questions or concerns.

## 2018-08-14 NOTE — PATIENT INSTRUCTIONS
Please contact the agency that have physicians that make house calls. Call the social work department if you need further assistance.

## 2018-08-14 NOTE — PROGRESS NOTES
[facility]  Nephrology Daily Progress Note    Flip Escalante  [id number]  80year old  Patient presents with:  Medication Request      HPI:   Flip Escalante is a 80year old female.   22-year-old female with a history of hypertension, peptic ulcer wheezing      PHYSICAL EXAM:     Vitals History 11/28/2017 12/22/2017 8/14/2018   /87 183/113 158/83   BP Location - - -   Pulse 111 90 69   Resp - - -   Temp - - -   SpO2 - - -   Weight 170 lbs - -   Height 61.000 - 62.000   BODY MASS INDEX - - - Rfl: 0  •  nystatin 188861 UNIT/GM External Ointment, Apply 1 Application topically 2 (two) times daily. , Disp: 30 g, Rfl: 0  •  Raloxifene HCl 60 MG Oral Tab, Take 1 tablet (60 mg total) by mouth once daily. , Disp: 30 tablet, Rfl: 5  •  Pantoprazole Sodiu have difficulty refilling her medications unless I could see her and do laboratory studies as instructed. As a result we agree that it is probably best if she try and arrange follow-up with physicians that make house calls.   We gave her a name of an agenc

## 2018-08-28 NOTE — TELEPHONE ENCOUNTER
Pt states she was told to do a in home labs today but she isnt feeling well. States she lost information [facility] had given her the info but is asking assistance to cxl the labs. Pls call. Thank you.

## 2018-08-31 NOTE — ED NOTES
Pt & pts son provided with discharge instructions & prescription. Verbalized understanding for plan of care at home and follow up. All questions/concerns addressed prior to discharge.

## 2018-08-31 NOTE — CM/SW NOTE
Called by ED RN to assist Pt in getting ride home. Pt's son is at bedside. Pt is not ambulatory. Per son, she can sit in a wheelchair. Requesting medicar. Explained [facility] policy on payment at time of service.  Pamphlet with payment information given to s

## 2018-08-31 NOTE — ED NOTES
Case management contacted to set up transport for pt back home. Pts son states that he is worried about driving her home bc it is dark.  currently at bedside. Will continue to monitor.

## 2018-09-04 NOTE — ED PROVIDER NOTES
Patient Seen in: [facility] AND [facility] Emergency Department    History   Patient presents with:  Urinary Symptoms (urologic)    Stated Complaint: DIFFICULTY URINATING    HPI    81 yo female brought by EMS.  She reports feeling like she cannot fully empty her Neck: Normal range of motion. Neck supple. Cardiovascular: Normal rate, regular rhythm, normal heart sounds and intact distal pulses. Pulmonary/Chest: Effort normal and breath sounds normal.   Abdominal: Soft.  Bowel sounds are normal. She exhibits n these medications    Nitrofurantoin Monohyd Macro 100 MG Oral Cap  Take 1 capsule (100 mg total) by mouth 2 (two) times daily. , Print Script, Disp-14 capsule, R-0

## 2018-09-05 PROBLEM — I82.412 DVT OF DEEP FEMORAL VEIN, LEFT (HCC): Status: ACTIVE | Noted: 2018-01-01

## 2018-09-05 NOTE — ED PROVIDER NOTES
Patient Seen in: [facility] AND [facility] Emergency Department    History   Patient presents with:  Hypertension (cardiovascular)  Urinary Symptoms (urologic)  Swelling Edema (cardiovascular, metabolic)      HPI    Patient presents to the ED complaining of high Concern  Caffeine Concern        Yes    Comment:2 cups daily soda/coffee  Pt has a pacemaker      No  Pt has a defibrillator  No  Reaction to local anes* No        ROS  Pertinent Positives: Elevated blood pressure  All other organ systems are reviewed and PLATELET    Narrative: The following orders were created for panel order CBC WITH DIFFERENTIAL WITH PLATELET.   Procedure                               Abnormality         Status                     ---------                               ----------- Room air, Normal     Monitor Interpretation:   normal sinus rhythm    Differential Diagnosis/ Diagnostic Considerations: UTI, DVT, cellulitis, inability to care for self, uncontrolled hypertension    Medical Record Review: I personally reviewed available p List

## 2018-09-05 NOTE — H&P
[address] Patient Status:  Inpatient    1924 MRN [id number]   Location [facility] 3W/SW Attending Destinee Burnett MD   Hosp Day # 0 PCP Bayron Luo MD     Date:  2018 Smokeless tobacco: Never Used                      Alcohol use: No               Comment: None. Pt does not smoke or use alcohol. Allergies/Medications: Allergies: Other                       Comment: Al WBC 10.0 09/05/2018   HGB 14.7 09/05/2018   HCT 44.7 09/05/2018    (H) 09/05/2018   CREATSERUM 0.66 09/05/2018   BUN 9 09/05/2018    09/05/2018   K 3.9 09/05/2018    09/05/2018   CO2 24 09/05/2018    (H) 09/05/2018   CA 9.1 09/0

## 2018-09-05 NOTE — ED INITIAL ASSESSMENT (HPI)
Patient to ER via EMS for c/o high blood pressure and inability to urinate x1 hour. She also has swelling to left lower extremity that has worsened over last few weeks.

## 2018-09-05 NOTE — CONSULTS
[facility]    Report of Consultation    [facility] Patient Status:  Inpatient    1924 MRN [id number]   Location [facility] 3W/SW Attending Colette Rascon MD   Hosp Day # 0 PCP Sabina Li MD     Date of Adm comprehensive review of systems was negative except for: Cardiovascular: positive for lower extremity edema    Physical Exam:  Extremities:  Left leg: extensive swelling from thigh to foot, below knee is warm to touch.   Good sensation and movement, denies

## 2018-09-06 NOTE — PLAN OF CARE
Pt IV keeps beeping occulusion, attempt x2 by me and another RN without success. Endorsed to next shift to follow up.

## 2018-09-06 NOTE — PROGRESS NOTES
[facility]    Progress Note    Kandice Jacob Patient Status:  Inpatient    1924 MRN [id number]   Location [facility] 3W/SW Attending Anita Campos MD   Hosp Day # 1 PCP Garth Lam MD       Subjective:     Feeling b xarelto     Trouble caring for self at home.    Pt lives at home with disabled son.  - PT/OT/SW consults  - general diet     Hx of HTN  - cont metoprolol     Hx of HL - cont lipitor     Anxiety and insomnia - cont meprobamate BID prn     dvt proph - Xarelto

## 2018-09-06 NOTE — PLAN OF CARE
Pt c/o bladder fullness and states she feels like she was not emptying her bladder. Pt is incontinent of urine, diaper was saturated. Bladder scan pt noted 193 ml.  PCT insturucted to place pt on bedpan to see if she can void, Pt was able to void 250ml, but

## 2018-09-06 NOTE — PHYSICAL THERAPY NOTE
PHYSICAL THERAPY EVALUATION - INPATIENT     Room Number: 347/347-A  Evaluation Date: 9/6/2018  Type of Evaluation: Initial   Physician Order: PT Eval and Treat    Presenting Problem:  (Acute DVT, UTI, H/O Anxiety, HTN, Multiple falls at home)  Reason for ability, which are below the patient's pre-admission status. Patient will benefit from continued IP PT services to address these deficits in preparation for discharge.   Recommend continuation of skilled PT in a rehab setting so pt can maximize functional CANCER Right      Comment: lumpectomy  No date: OTHER SURGICAL HISTORY      Comment: T&A  No date: OTHER SURGICAL HISTORY      Comment: AURA  2009: OTHER SURGICAL HISTORY      Comment: L hip surgery  1997: OTHER SURGICAL HISTORY      Comment: Colonoscopy shortness of breath  Heart Rate: 81IAB    AM-PAC '6-Clicks' INPATIENT SHORT FORM - BASIC MOBILITY  How much difficulty does the patient currently have. ..  -   Turning over in bed (including adjusting bedclothes, sheets and blankets)?: A Little   -   Sittin ambulate 5 feet with assist device: walker - rolling at assistance level: Min Ax2   Goal #3   Current Status                    Goal #6    Goal #6  Current Status

## 2018-09-06 NOTE — OCCUPATIONAL THERAPY NOTE
OCCUPATIONAL THERAPY EVALUATION - INPATIENT     Room Number: 347/347-A  Evaluation Date: 9/6/2018  Type of Evaluation: Initial  Presenting Problem:  (LT LE swelling)    Physician Order: IP Consult to Occupational Therapy  Reason for Therapy: ADL/IADL Dysfu training; Endurance training;UE strengthening/ROM; Patient/Family training;Equipment eval/education       OCCUPATIONAL THERAPY MEDICAL/SOCIAL HISTORY     Problem List  Principal Problem:    DVT of deep femoral vein, left [facility])      Past Medical History  Past O    Behavioral/Emotional/Social: pleasant    RANGE OF MOTION   Upper extremity ROM is within functional limits   STRENGTH ASSESSMENT  Upper extremity strength is within functional limits     ACTIVITIES OF DAILY LIVING ASSESSMENT  AM-PAC ‘6-Clicks’ [place]

## 2018-09-07 NOTE — OCCUPATIONAL THERAPY NOTE
OCCUPATIONAL THERAPY TREATMENT NOTE - INPATIENT        Room Number: 347/347-A           Presenting Problem:  (LT LE swelling)    Problem List  Principal Problem:    DVT of deep femoral vein, left (HCC)      ASSESSMENT   Pt seen up in bed and performed supi body clothing?: A Lot  -   Taking care of personal grooming such as brushing teeth?: A Little  -   Eating meals?: None    AM-PAC Score:  Score: 14  Approx Degree of Impairment: 59.67%  Standardized Score (AM-PAC Scale): 33.39  CMS Modifier (G-Code): CK

## 2018-09-07 NOTE — PHYSICAL THERAPY NOTE
PHYSICAL THERAPY TREATMENT NOTE - INPATIENT     Room Number: 347/347-A       Presenting Problem:  (Acute DVT, UTI, H/O Anxiety, HTN, Multiple falls at home)    Problem List  Principal Problem:    DVT of deep femoral vein, left (HCC)      ASSESSMENT   Patie setting so pt can maximize functional potential and achieve PLF. Recommend 24 hour supervision/assist upon DC from the hospital to ensure safety and for fall prevention.          DISCHARGE RECOMMENDATIONS  PT Discharge Recommendations: 24 hour care/supervi STATUS  Gait Assessment   Gait Assistance:  (Mod Ax2)  Distance (ft): 5 steps  Assistive Device: Rolling walker  Pattern:  (unseady and unsafe with premature sitting)  Stoop/Curb Assistance: Not tested           THERAPEUTIC EXERCISES  Lower Extremity Alter

## 2018-09-07 NOTE — CM/SW NOTE
ADDENDUM3:36PM    SW informed by [facility] liaison [phone] that they are able to accept pt when medically stable for discharge. / to remain available for support and/or discharge planning.      Renato Villanueva  [id number]

## 2018-09-07 NOTE — CM/SW NOTE
09/07/18 0800   CM/SW Screening   Patient's Mental Status Alert;Oriented   Patient's [address]   Number of Levels in Home 1   Number of Stair in Home (3 to enter in front, 2 to exit in back)   Patient lives with Children  (Son who is on dis

## 2018-09-07 NOTE — PROGRESS NOTES
[facility]    Progress Note    Mikael Mimes Patient Status:  Inpatient    1924 MRN [id number]   Location [facility] 3W/SW Attending Treasure Gracia MD   Hosp Day # 2 PCP Katia Alvarez MD       Subjective:     Pt c/o an

## 2018-09-08 NOTE — PLAN OF CARE
HEMATOLOGIC - ADULT    • Maintains hematologic stability Progressing        MUSCULOSKELETAL - ADULT    • Return mobility to safest level of function Progressing        Patient Centered Care    • Patient preferences are identified and integrated in the javier

## 2018-09-08 NOTE — DISCHARGE SUMMARY
[facility]    Discharge Summary    Shira Diaz Patient Status:  Inpatient    1924 MRN [id number]   Location [facility] 3W/SW Attending Kathleen Oneill MD   [address] Day # 3 PCP Alfonso Lindsay MD     Date of Admission:  she felt like she was having a hard time \"pushing the urine out. \"    Pt denies f/c. Denies burning with urination. Pt urinating on own with no problem now. Pt also noted LLE swelling and found to have DVT. Pt interested in going to rehab.   No n/ 11     Sulfamethoxazole-TMP -160 MG Tabs per tablet  Commonly known as:  BACTRIM DS      Take 1 tablet by mouth 2 (two) times daily for 4 days.    Stop taking on:  9/12/2018  Quantity:  8 tablet  Refills:  0        CHANGE how you take these medication Tabs  · Oxybutynin Chloride 5 MG Tabs  · rivaroxaban 15 MG Tabs  · Rivaroxaban 20 MG Tabs  · Sulfamethoxazole-TMP -160 MG Tabs per tablet         Follow up Visits:     Follow-up Information     Maggie Washburn MD. Schedule an appointment as soon as p

## 2018-09-08 NOTE — PLAN OF CARE
Problem: Patient Centered Care  Goal: Patient preferences are identified and integrated in the patient's plan of care  Interventions:  - What would you like us to know as we care for you?  \"I don't have a lot of help at home, I only take showers every coup needed  - Advance activity as appropriate  - Communicate ordered activity level and limitations with patient/family   Outcome: Progressing  Patient slowly increasing her activity with PT, up in chair, use lift to get back to bed, plan for discharge to  [place]

## 2018-09-08 NOTE — CM/SW NOTE
RUBEN spoke with RN who states a dc order has been obtained for today. RN confirmed the pt is on room air, no isolation. However, pt is apparently complete care and forgetful, not appropriate to be left alone.  RUBEN confirmed bed at [facility] snf for bennett

## 2018-09-10 NOTE — PROGRESS NOTES
Reji Hernandez  : 1924  Age 80year old  female patient is admitted to [place] [address] for strengthening and rehab on 18.     Chief complaint: LLE DVT/weakness/UTI/Urinary retention    CODE STATUS: DNR    Admitted to [address] [place] [address]:     HPI • Osteoarthritis    • Shingles      Past Surgical History:  1994: HX BREAST CANCER; Right      Comment:  lumpectomy  No date: OTHER SURGICAL HISTORY      Comment:  T&A  No date: OTHER SURGICAL HISTORY      Comment:  AURA  2009: OTHER SURGICAL HISTORY mg total) by mouth 2 (two) times daily. Disp: 30 tablet Rfl: 0   atorvastatin 40 MG Oral Tab TAKE 1 TABLET BY MOUTH NIGHTLY. Disp: 30 tablet Rfl: 0   METOPROLOL TARTRATE 100 MG Oral Tab TAKE 1 TABLET (100 MG TOTAL) BY MOUTH 2 (TWO) TIMES DAILY.  Disp: 60 ta carotid bruits  BREAST: deferred   RESPIRATORY:Rales left base: poor inspiratory effort   CARDIOVASCULAR: S1, S2 normal, RRR; no S3, no S4;   ABDOMEN:  normal active BS+, soft, nondistended; no organomegaly, no masses; no bruits; nontender, no guarding, no

## 2018-09-11 NOTE — PROGRESS NOTES
Abena Ghotra, 5/20/1924, 80year old, female at [place] [address]   for strengthening and rehab on 9/8/18.     Chief complaint: LLE DVT/weakness/UTI/Urinary retention/Bilateral lower lobe infiltrates      CODE STATUS: DNR     Admitted to [facility]: 9/5/18-9/8/20 Pulse 62   Temp 97.2 °F (36.2 °C)   Resp 16   LMP  (LMP Unknown)   SpO2 95%     PHYSICAL EXAM:  GENERAL HEALTH: well developed, well nourished, in no apparent distress, appears depressed flat affect  LINES, TUBES, DRAINS:  Maki catheter  SKIN: no rashes, intervention per IR  -Pt was given heparin drip and then transitioned to xarelto.  -On Xarelto 15 mg BID till 9/27 then 20 mg QD on 9/28/18  -Monitor for any CMS changes to LLE  -Slight erythema noted      4.  Trouble caring for self at home.   Pt lives at

## 2018-09-13 NOTE — PROGRESS NOTES
Evan Wiggins, 5/20/1924, 80year old, female at [place] [address] for strengthening and rehab on 9/8/18.     Chief complaint: LLE DVT/weakness/UTI/Urinary retention/Bilateral lower lobe infiltrates      CODE STATUS: DNR     Admitted to [facility]: 9/5/18-9/8/2018 cerumen.   NECK: supple; FROM; no JVD, no TMG, no carotid bruits  BREAST: deferred   RESPIRATORY:Diminished bilateral bases-improving   CARDIOVASCULAR: S1, S2 normal, RRR; no S3, no S4;   ABDOMEN:  normal active BS+, soft, nondistended; no organomegaly, no x-ray/PNA  -CXR with findings as above  -Levaquin 500 mg QD x 7 days (till 9/17) with Probiotic   -Duonebs TID and PRN  -Push fluids and monitor for any AMS changes  -Repeat labs due 9/14  9.  Hyperkalemia  -K 5.3 today: 15 grams Kayex today and repeat BMP

## 2018-09-14 NOTE — PROGRESS NOTES
Reji Hernandez, 5/20/1924, 80year old, female at [place] [address]   for strengthening and rehab on 9/8/18.     Chief complaint: LLE DVT/weakness/UTI/Urinary retention/Bilateral lower lobe infiltrates      CODE STATUS: DNR     Admitted to [facility]: 9/5/18-9/8/20 FROM; no JVD, no TMG, no carotid bruits  BREAST: deferred   RESPIRATORY:Diminished bilateral bases-improving   CARDIOVASCULAR: S1, S2 normal, RRR; no S3, no S4;   ABDOMEN:  normal active BS+, soft, nondistended; no organomegaly, no masses; no bruits; nonte TID and PRN  -Push fluids and monitor for any AMS changes  -Repeat labs due 9/14  9.  Hyperkalemia  -K 5.3 9/13: 15 grams Kayex today  -Repeat BMP on 9/14= 4.4  -Continue to monitor     This is a 15 minute visit and greater than 50% of the time was spent co

## 2018-09-18 NOTE — PROGRESS NOTES
Jaminford Duane, 5/20/1924, 80year old, female at [place] [address] for strengthening and rehab on 9/8/18.     Chief complaint: LLE DVT/weakness/UTI/Urinary retention/Bilateral lower lobe infiltrates      CODE STATUS: DNR     Admitted to [facility]: 9/5/18-9/8/2018 normocephalic; normal nose, no nasal drainage, mucous membranes slightly dry,  pharynx no exudate, no visible cerumen.   NECK: supple; FROM; no JVD, no TMG, no carotid bruits  BREAST: deferred   RESPIRATORY:Diminished bilateral bases-improving   CARDIOVASCU PRN     8. Bilateral lower lobe infiltrates on x-ray/PNA  -CXR with findings as above  -Levaquin 500 mg QD x 7 days (till 9/17) with Probiotic   -Duonebs TID and PRN  -Push fluids and monitor for any AMS changes  -Repeat labs due 9/14  9.  Hyperkalemia  -K

## 2018-09-21 NOTE — PROGRESS NOTES
Louann Singh, 5/20/1924, 80year old, female at [place] [address] for strengthening and rehab on 9/8/18.     Chief complaint: LLE DVT/weakness/UTI/Urinary retention/Bilateral lower lobe infiltrates      CODE STATUS: DNR     Maki inserted 9/18/2018 in [place] JVD, no TMG, no carotid bruits  BREAST: deferred   RESPIRATORY:Diminished bilateral bases-improving   CARDIOVASCULAR: S1, S2 normal, RRR; no S3, no S4;   ABDOMEN:  normal active BS+, soft, nondistended; no organomegaly, no masses; no bruits; nontender, no monitor for any AMS changes  -Repeat labs due 9/14 as above: next due 9/24  9.  Hyperkalemia  -K 5.3 9/13: 15 grams Kayex today  -Repeat BMP on 9/14= 4.4 9/24  -Continue to monitor    This is a 15 minute visit and greater than 50% of the time was spent coun

## 2018-09-25 NOTE — PROGRESS NOTES
Carey Course, 5/20/1924, 80year old, female at [facility] for strengthening and rehab on 9/8/18.     Chief complaint: LLE DVT/weakness/UTI/Urinary retention/Bilateral lower lobe infiltrates      CODE STATUS: DNR     Maki inserted 9/18/2018 in [facility] catheter  SKIN: no rashes, no suspicious lesions, pale  WOUND:LLE slight erythema   EYES: PERRLA, EOMI, sclera anicteric, conjunctiva normal; there is no nystagmus, no drainage from eyes  HENT: normocephalic; normal nose, no nasal drainage, mucous membrane LLE  -Slight erythema noted      4. Trouble caring for self at home.   Pt lives at home with disabled son.  - PT/OT/SW consulted for rehab      5. Hx of HTN  - cont metoprolol  -VS Q shift  -Started on Lasix 20 mg QD for BLE edema   -Monitor labs     6. Hx

## 2018-09-26 NOTE — TELEPHONE ENCOUNTER
Patient contacted. She is at [facility] and was given laxatives for constipation. When she started having loose BMs they put in a Maki catheter and she is having pain in her bladder from the catheter.  She said no one there is listening to her and she is ve

## 2018-09-26 NOTE — TELEPHONE ENCOUNTER
I told her that when I spoke to her. She is just so upset because the nurses are not listening to her.

## 2018-09-26 NOTE — TELEPHONE ENCOUNTER
Attempted to contact pt.  at [facility] said her line was busy and cannot get through. Her direct line to her room is [phone] if we try again later.

## 2018-09-26 NOTE — TELEPHONE ENCOUNTER
Pt called to speak w davon attempt to transfer with no answer please call. Pt states she has a catheter and it is driving her crazy she would like to know if It should be taken out or not.  Pt was upset and states she hates to complain but she is miserable

## 2018-09-27 NOTE — PROGRESS NOTES
Gisele Ross, 5/20/1924, 80year old, female at [place] [address]  for strengthening and rehab on 9/8/18.     Chief complaint: LLE DVT/weakness/UTI/Urinary retention/Bilateral lower lobe infiltrates      CODE STATUS: DNR     Maki removed 9/27/2018 in [facility] eyes  HENT: normocephalic; normal nose, no nasal drainage, mucous membranes slightly dry,  pharynx no exudate, no visible cerumen.   NECK: supple; FROM; no JVD, no TMG, no carotid bruits  BREAST: deferred   RESPIRATORY:Diminished bilateral bases-improving  9/28/18  -Monitor for any CMS changes to LLE  -Slight erythema noted      4. Trouble caring for self at home.   Pt lives at home with disabled son.  - PT/OT/SW consulted for rehab      5. Hx of HTN  - cont metoprolol  -VS Q shift  -Started on Lasix 20 mg Q

## 2018-09-28 NOTE — PROGRESS NOTES
Oumou Crouch, 5/20/1924, 80year old, female at [place] [address] for strengthening and rehab on 9/8/18.     Chief complaint: LLE DVT/weakness/UTI/Urinary retention/Bilateral lower lobe infiltrates      CODE STATUS: DNR     Maki removed 9/27/2018 in rehab dry,  pharynx no exudate, no visible cerumen.   NECK: supple; FROM; no JVD, no TMG, no carotid bruits  BREAST: deferred   RESPIRATORY:Diminished bilateral bases-improving   CARDIOVASCULAR: S1, S2 normal, RRR; no S3, no S4;   ABDOMEN:  normal active BS+, sof self at home.   Pt lives at home with disabled son.  - PT/OT/SW consulted for rehab      5. Hx of HTN  - cont metoprolol  -VS Q shift  -Started on Lasix 20 mg QD for BLE edema   -Monitor labs     6. Hx of HL   - cont lipitor     7. Anxiety and insomnia   -

## 2018-10-02 NOTE — TELEPHONE ENCOUNTER
I did speak to patient and advised her at that time that she needs to talk to the nurse careing for her and make sure the nurse speaks to the attending physician at the nursing home.

## 2018-10-02 NOTE — PROGRESS NOTES
Esther Gallardo, 5/20/1924, 80year old, female at [place] [address] for strengthening and rehab on 9/8/18.     Chief complaint: LLE DVT/weakness/UTI/Urinary retention/Bilateral lower lobe infiltrates      CODE STATUS: DNR     Maki removed 9/27/2018 in rehab EXAM:  GENERAL HEALTH: well developed, well nourished, in no apparent distress, appears depressed flat affect  LINES, TUBES, DRAINS:  Maki catheter re-inserted in rehab 9/27  SKIN: no rashes, no suspicious lesions, pale  WOUND:LLE slight erythema   EYES: switched from Keflex to Levaquin 500 mg on 10/2/18  -CTM  -Holding Xarelto on 10/1 due to hematuria: resume on 10/4     3. LLE acute extensive DVT.   - no intervention per IR  -Pt was given heparin drip and then transitioned to xarelto.  -On Xarelto 15 mg B

## 2018-10-04 NOTE — TELEPHONE ENCOUNTER
LOV 8/14/18. Per LOV, pt is to seek care with a home visiting physician. I believe she's currently at [facility].

## 2018-10-04 NOTE — TELEPHONE ENCOUNTER
Pt not seen by cardiology in 2 years, probable routing error. Routed to Dr. Marly Azevedo clinical staff.

## 2018-10-04 NOTE — PROGRESS NOTES
Jeff Mccabe, 5/20/1924, 80year old, female is being discharged from [place] [address] on Thursday 10/4/2018    DISCHARGE SUMMARY    Date of Admission to [facility]: 9/8/2018    Date of Discharge from [facility]: 10/4/2018                              Admit extremely high risk for re-admission.       PHYSICAL EXAM:  GENERAL HEALTH: well developed, well nourished, in no apparent distress, appears depressed flat affect  LINES, TUBES, DRAINS:  Maki catheter re-inserted in rehab 9/27  SKIN: no rashes, no suspicio Levaquin 500 mg on 10/2/18  -CTM  -Holding Xarelto on 10/1 due to hematuria: resume on 10/4     3. LLE acute extensive DVT.   - no intervention per IR  -Pt was given heparin drip and then transitioned to xarelto.  -On Xarelto 15 mg BID till 9/27 then 20 mg

## 2018-10-04 NOTE — TELEPHONE ENCOUNTER
If she is at [facility] she needs to get refills from the doctor who is taking care of her there.

## 2018-10-04 NOTE — TELEPHONE ENCOUNTER
Contacted pharmacy and notified them [phone] denied RF request as pt is currently in a nursing home.

## 2018-10-05 NOTE — TELEPHONE ENCOUNTER
Pt started home care today . Pt will have a nurse PT and a  going out to see her . Pt sounded depressed and she has appointment to see dr Yolanda Pina on 23rd . Would like to confirm if dr Yolanda Pina would sign home care orders .

## 2018-10-05 NOTE — TELEPHONE ENCOUNTER
Okay if she can see me in the office. Before this last hospitalization she was talking about getting 1 of those physicians who just make house visits.

## 2018-10-05 NOTE — TELEPHONE ENCOUNTER
Spoke to Shmuel Case. Dr. Augustus Lewis advice relayed to have patient make an appointment if she is not going to have a house physician see her and that Dr. Calos Cottrell will sign Shmuel Marcus orders.  Patient actually has a follow up visit with Dr. Calos Cottrell scheduled for

## 2018-10-08 NOTE — TELEPHONE ENCOUNTER
Spoke to pt for TCM today. Pt requesting rx for anti-depressant, not anti-anxiety medication. Advised pt will likely need to be seen for this.   She verbalized understanding and states she is not able to come in for appt- too weak at this time and does no

## 2018-10-08 NOTE — PROGRESS NOTES
Initial Post Discharge Follow Up   Discharge Date: 10/4/18--d/c from [facility] Date: 10/8/2018    Consent Verification:  Assessment Completed With: Patient  HIPAA Verified?   Yes    Discharge Dx:  LLE DVT, weakness, UTI, Urinary retention, B Current Outpatient Medications:  levofloxacin 500 MG Oral Tab Take 500 mg by mouth daily. Disp:  Rfl:    Solifenacin Succinate 5 MG Oral Tab Take 5 mg by mouth daily.  Disp:  Rfl:    MEPROBAMATE 400 MG Oral Tab TAKE 1 TABLET BY MOUTH TWICE A DAY AS NEED prescribed? No  Are you having any concerns with constipation? Yes, pt states she has tried 3 laxatives and still has not had a BM in 4 days. Referrals/orders at D/C:  Home Health ordered at D/C? Yes   Has HH been set up?   Yes   If Yes: With Whom:  R PCP TCM/HFU appointment with pt:  Yes      Have you made all of your follow up appointments? no    Is there any reason as to why you cannot make your appointments?    No     NCM Reviewed upcoming Specialist Appt with patient     Not Applicable      Interven

## 2018-10-08 NOTE — TELEPHONE ENCOUNTER
I thought she had home health already. Okay to do if it is not set up. Again she needs to get further medical care from those physicians who make house calls.

## 2018-10-09 NOTE — TELEPHONE ENCOUNTER
Spoke to pts caregiver. They were able to reach [facility] and the nurse is coming to the house at 1:00 pm. Aware to bring pt to ER if she continues to refuse [facility] visits.

## 2018-10-09 NOTE — TELEPHONE ENCOUNTER
Patient called back. I advised her that if she keeps cancelling the [facility] nurse and cannot leave the house to see Dr. Jamar Naranjo she needs to see a visiting physician to evaluate her condition.  She was discharged from [facility] with a indwelling Maki catheter a

## 2018-10-09 NOTE — TELEPHONE ENCOUNTER
Pt states she is suppose to have someone for Homehealth to visit her today but she does not want to see them as she is having restroom issues. Requesting MKK\"s office to reach out to  for tomorrow.   Pt does not know which Homehealth care comes to the h

## 2018-10-10 NOTE — TELEPHONE ENCOUNTER
Pt called and stated she has been having trouble sleeping and is very restless and agitated would like to know if dr can prescribe her something please call.  Caregiver aimee [phone]

## 2018-10-11 NOTE — TELEPHONE ENCOUNTER
I did some research and found that [facility]. They have a referral form I was able to print out. I have filled it out and will check with patient to make sure she's okay with me sending it in for her.  I spoke to a rep for [facility] and th

## 2018-10-11 NOTE — TELEPHONE ENCOUNTER
Pt. is confused and is not sure she needs to take any med., or if refills have been sent to her pharm. ? Pt. States that she is not sure if she dreamt that our office called her to tell her to p/up med at her pharm., I transferred the call to RN.

## 2018-10-11 NOTE — TELEPHONE ENCOUNTER
No I am not going to go by what a caretaker says. She needs to be seen or get one of those physicians who makes home visits.   I think she also refused home health

## 2018-10-12 NOTE — TELEPHONE ENCOUNTER
Received call from [facility] from [facility] confirming that they will be going out to see patient next Wednesday.

## 2018-10-12 NOTE — TELEPHONE ENCOUNTER
Spoke to pt and explained that MKK cannot prescribe medication as she isn't able to come in to office to see him anymore.  I also explained that I have found a home visiting physician company that serves [facility] and asked for her permission to send them a

## 2018-10-15 NOTE — TELEPHONE ENCOUNTER
Spoke to caregiver. She states the patient is in a \"crisis situation. \" She is out of her medications. She has a home visiting doctor coming to see her next Wednesday, but needs a refill to get her through until this appointment.  She is requesting refills

## 2018-10-15 NOTE — TELEPHONE ENCOUNTER
Rxs for atorvastatin and metoprolol sent electronically and rx for meprobamate BID #[id number] called into voicemail per [facility] authorization below. Contacted pt's nephew/POA and notified him rxs were sent.  He also dropped off forms that need to be completed as

## 2018-10-15 NOTE — TELEPHONE ENCOUNTER
Sent call to VERO Israel Requesting to speak with RN. Pls call - refuses to give details. Thank you.

## 2018-10-15 NOTE — TELEPHONE ENCOUNTER
Dr. Florencio Potter would you be willing to provide a short term refill until patient sees home visiting doctor next Wednesday?

## 2018-10-17 NOTE — TELEPHONE ENCOUNTER
Contacted NATANAEL Maloney and notified him forms are completed for admission to [facility]. He asked that I mail the forms since he does not live nearby. Forms mailed to him ([id number] Chris Salcedo, [address] [place] [address]).     POA form given to  to s

## 2018-10-24 NOTE — TELEPHONE ENCOUNTER
Dr. Millie William, would you be willing to give Deadra Means a 3-4 week supply of medication until her PCP is assigned?

## 2018-10-24 NOTE — TELEPHONE ENCOUNTER
Spoke to Manpreet. Explained our situation with our communications with Adalberto Mamadou for months now that she should have a home visiting doctor to start caring for her since she cannot easily come to office to see [facility] anymore.  I sent a referral form to [facility] w

## 2018-10-24 NOTE — TELEPHONE ENCOUNTER
Cristiana(Caregiver) inquiring why medication refill was denied. Bessie Pham states pt currently has only 2 pills left. [facility] pt is requesting to have [address] as PCP until new  PCP has been assigned in three weeks in order to receive medication.  Please call than

## 2018-10-24 NOTE — TELEPHONE ENCOUNTER
No this was just filled on October 15 and they told me that the home physician was supposed to see her in October 17.

## 2018-10-25 NOTE — TELEPHONE ENCOUNTER
Contacted pt's caregiver, Aubrey Munoz, to find out if anyone from [facility] had contacted her. She states she did hear from them and they sent out a nurse practitioner to see her today. She is very grateful and thanked me for getting this arranged for [employer].  Heather Richardson

## 2018-10-25 NOTE — TELEPHONE ENCOUNTER
Spoke to [facility] at [facility]. He's not sure what happened but he apologizes that no one was out to see Silvestre Salazar last week. He asked that I refax the referral form and he will contact patient as soon as he receives.  I asked that he contact [facility] to discuss and

## 2018-11-06 NOTE — TELEPHONE ENCOUNTER
Beba/Patient advocate requesting doctor's note stating why pt is unable to go to social security administration office. Milissa Goldmann states note is needed in order for Fredy, ph:[phone]) to start process for pt to receive social security card.

## 2018-11-07 NOTE — TELEPHONE ENCOUNTER
Contacted Beba. Advised to contact [facility] as they had a nurse practitioner see the patient about 1-2 weeks ago. She will call them.

## 2018-11-18 PROBLEM — E87.1 HYPONATREMIA: Status: ACTIVE | Noted: 2018-01-01

## 2018-11-18 PROBLEM — D64.9 ANEMIA: Status: ACTIVE | Noted: 2018-01-01

## 2018-11-18 PROBLEM — N39.0 UTI (URINARY TRACT INFECTION): Status: ACTIVE | Noted: 2018-01-01

## 2018-11-18 PROBLEM — N30.00 ACUTE CYSTITIS WITHOUT HEMATURIA: Status: ACTIVE | Noted: 2018-01-01

## 2018-11-18 NOTE — CM/SW NOTE
Spoke to Su Cotter who is the pts advocate for healthcare. Cell# [phone]. She is working in conjunction with the pts nephew Inga Argueta for the pts healthcare.  Brennan Betancourt mentioned that they want to transition pt to [facility]

## 2018-11-18 NOTE — ED NOTES
BLADDER SCANNER PERFORMED  TO R/O POSSIBLE URINARY RETENTION, 37 ML OF URINE SCANNER STATES. INSERTED 16 F MONET WITHOUT DIFFICULTY, + SMALL AMOUNT OF URINE OBTAINED FROM THE PREVIOUS CATHETER SITE AT THE SITE. SENT TO LAB.

## 2018-11-18 NOTE — ED NOTES
Orders for admission, patient is aware of plan and ready to go upstairs.  Any questions, please call ED RN Bisi Dillard   at extension [phone]

## 2018-11-18 NOTE — H&P
[address] Patient Status:  Emergency    1924 MRN [id number]   Location [address] Attending Eddie Melton MD   Hosp Day # 0 PCP Ritesh Neal MD     Date: Systems:   Pertinent items are noted in HPI. 12 systems reviewed and otherwise negative. Physical Exam:   Vital Signs:  Blood pressure 148/62, pulse 87, temperature 98.7 °F (37.1 °C), temperature source Oral, resp.  rate 18, height 5' (1.524 m), weight disease or large pleural effusion. 3. Prominent interstitial markings, which appear similar in comparison to March, 2017. These may reflect senescent change or chronic low-grade interstitial edema. 4. Lesser incidental findings as above.      Dictated by (C

## 2018-11-18 NOTE — ED NOTES
Pt now sleeping with nad, no resp distress,easy to arouse  Will continue to monitor  Awaiting bed assignment

## 2018-11-18 NOTE — ED PROVIDER NOTES
Patient Seen in: [facility] AND [facility] Emergency Department    History   Patient presents with:  Cath Tube Problem (gastrointestinal, urinary, integumentary)    Stated Complaint: Pain in abdomen. Needs new irby.     HPI    The patient is a 66-year-old female 1241 98.7 °F (37.1 °C)   Temp src 11/18/18 1241 Oral   SpO2 11/18/18 1241 95 %   O2 Device 11/18/18 1241 None (Room air)       Current:/79   Pulse 91   Temp 98.7 °F (37.1 °C) (Oral)   Resp 18   Ht 152.4 cm (5')   Wt 79.4 kg   LMP  (LMP Unknown)   SpO Urine 40  (*)     WBC Urine 741 (*)     WBC Clump Moderate (*)     RBC URINE 7 (*)     Bacteria Urine Few (*)     All other components within normal limits   CBC W/ DIFFERENTIAL - Abnormal; Notable for the following components:    WBC 15.2 (*)     HGB 11.5 incidental findings as above.      Dictated by (CST): Blade Muniz MD on 11/18/2018 at 14:44     Approved by (CST): Blade Muniz MD on 11/18/2018 at 14:46            Radiology exams  Viewed and reviewed by myself and findings discussed with patient in

## 2018-11-18 NOTE — ED INITIAL ASSESSMENT (HPI)
Patient presents to ER for urinary catheter replacement - current catheter is not draining properly - brief is wet.

## 2018-11-18 NOTE — ED NOTES
20g piv established to L AC, flushes well, no s/s of infiltration noted.    Iv abx started, see mar    Pt food tray arrived, now eating without incident  stable

## 2018-11-19 NOTE — PROGRESS NOTES
[facility]    CDI Prediction Tool Protocol (Vancomycin Initiated)    OVP (oral vancomycin prophylaxis) 125 mg PO Daily is being started in this patient based on a score of 13.       Score Breakdown:  High risk antibiotic use (5 points)  Malignanc

## 2018-11-19 NOTE — PLAN OF CARE
Problem: Patient Centered Care  Goal: Patient preferences are identified and integrated in the patient's plan of care  Interventions:  - What would you like us to know as we care for you?  I have my own wheel chair  - Provide timely, complete, and accurate behaviors that affect risk of falls.   - [place] fall precautions as indicated by assessment.  - Educate pt/family on patient safety including physical limitations  - Instruct pt to call for assistance with activity based on assessment  - Modify environme

## 2018-11-19 NOTE — PLAN OF CARE
Problem: Patient Centered Care  Goal: Patient preferences are identified and integrated in the patient's plan of care  Interventions:  - What would you like us to know as we care for you?  I have my own wheel chair  - Provide timely, complete, and accurate [place] fall precautions as indicated by assessment.  - Educate pt/family on patient safety including physical limitations  - Instruct pt to call for assistance with activity based on assessment  - Modify environment to reduce risk of injury  - Provide a

## 2018-11-19 NOTE — BH PROGRESS NOTE
Behavioral Health Note  CHIEF COMPLAINT  Sepsis 2/2 UTI from chronic irby    REASON FOR ADMISSION  Sepsis 2/2 UTI from chronic irby    SOCIAL HISTORY  Veto Lorenzo lives with her son and is retired from the [employer].   She does not smoke, drink alcohol or use drugs

## 2018-11-19 NOTE — PHYSICAL THERAPY NOTE
PHYSICAL THERAPY QUICK EVALUATION - INPATIENT    Room Number: 568/568-A  Evaluation Date: 11/19/2018  Presenting Problem: sepsis secondary to UTI from chronic irby  Physician Order: PT Eval and Treat    Problem List  Principal Problem:    Essential hype joanne.    OBJECTIVE  Precautions: Limb alert - right  Fall Risk: High fall risk    AM-PAC '6-Clicks' INPATIENT SHORT FORM - BASIC MOBILITY  How much difficulty does the patient currently have. ..  -   Turning over in bed (including adjusting bedclothes, shee her arm, she will lift it up and then just let it fall in her lap. Pt is at her functional mobility baseline. Pt will be d/c from IP PT services.  Recommend return to 24/7 care at hospital d/c.   PT Discharge Recommendations: (Return to 24/7 care)    PLAN

## 2018-11-19 NOTE — OCCUPATIONAL THERAPY NOTE
OCCUPATIONAL THERAPY EVALUATION - INPATIENT     Room Number: 568/568-A  Evaluation Date: 11/19/2018  Type of Evaluation: Quick Eval  Presenting Problem: (essential hypertension)    Physician Order: IP Consult to Occupational Therapy  Reason for Therapy: Problems:    Hyponatremia    Anemia    Acute cystitis without hematuria    UTI (urinary tract infection)      Past Medical History  Past Medical History:   Diagnosis Date   • Cancer ([facility] [place] [address]     Breast. [place]   • Cystocele     ring with support pessary   • E Impaired   Fine Motor: WFL     ACTIVITIES OF DAILY LIVING ASSESSMENT  AM-PAC ‘6-Clicks’ Inpatient Daily Activity Short Form  How much help from another person does the patient currently need…  -   Putting on and taking off regular lower body clothing?: A L

## 2018-11-20 NOTE — TELEPHONE ENCOUNTER
Amelia Kelly  from [facility] 5th floor calling stating patient is admitted due to UTI and family member is requesting Dr Ana Rosa Claire to come in and change pessary, please advice. Patient is in room 568.   Sarah Rodrigues dr is not in yet until 1pm

## 2018-11-20 NOTE — PROGRESS NOTES
[facility]    Progress Note    Reji Hernandez Patient Status:  Observation    1924 MRN [id number]   Location [facility] 5SW/SE Attending Yonis Awad MD   Hosp Day # 1 PCP Carrie Lua MD       SUBJECTIVE:  Feeling Alla Diaz MD on 11/18/2018 at 15:15          Xr Chest Ap Portable  (cpt=71045)    Result Date: 11/18/2018  CONCLUSION:  1. Stable elevation of the left hemidiaphragm with probable left basilar atelectasis/scarring.  2. No additional significant airspace dise Problem List:     Cystocele     Essential hypertension     Hyperlipidemia     Malignant neoplasm of breast (female) ([address]     Hypercholesterolemia     Edema     Palpitations     Intertrigo     Hyperglycemia     Azotemia     Nausea vomiting and diarrhea

## 2018-11-20 NOTE — CM/SW NOTE
Return call from Dr. Almazan Clubs, patients OB/GYNE , Patient will need to follow up with Dr. Suzanne Enriquez as an outpatient as Dr. Maria Eugenia Livingston not be able to see patients today prior to discharge. Patient and Eileen Cadena informed.      / to

## 2018-11-20 NOTE — TELEPHONE ENCOUNTER
Message sent to Hermilo Reece [phone]. [address] (pt's ) can be reached at [phone]. Pt is scheduled to be discharged today.

## 2018-11-20 NOTE — PLAN OF CARE
Problem: Patient Centered Care  Goal: Patient preferences are identified and integrated in the patient's plan of care  Interventions:  - What would you like us to know as we care for you?  I have my own wheel chair  - Provide timely, complete, and accurate [place] fall precautions as indicated by assessment.  - Educate pt/family on patient safety including physical limitations  - Instruct pt to call for assistance with activity based on assessment  - Modify environment to reduce risk of injury  - Provide a

## 2018-11-20 NOTE — CM/SW NOTE
11/20/18 1100   Discharge disposition   Expected discharge disposition Home-Health   Name of Facillity/Home Care/Hospice Residential   Home services after discharge Employed caregiver   Referrals provided Yes   Discharge transportation ( Transportation

## 2018-11-20 NOTE — PLAN OF CARE
Problem: Patient Centered Care  Goal: Patient preferences are identified and integrated in the patient's plan of care  Interventions:  - What would you like us to know as we care for you?  I have my own wheel chair  - Provide timely, complete, and accurate [place] fall precautions as indicated by assessment.  - Educate pt/family on patient safety including physical limitations  - Instruct pt to call for assistance with activity based on assessment  - Modify environment to reduce risk of injury  - Provide a

## 2018-11-20 NOTE — CM/SW NOTE
Residential HHC order in, Palliative Care Order for [address] to follow at homeSocial worker/ to remain available for support and/or discharge planning. Chey Camp RN Case Manager   Ext.  [phone]

## 2018-11-20 NOTE — PLAN OF CARE
Problem: Patient Centered Care  Goal: Patient preferences are identified and integrated in the patient's plan of care  Interventions:  - What would you like us to know as we care for you?  I have my own wheel chair  - Provide timely, complete, and accurate deficits and behaviors that affect risk of falls.   - [place] fall precautions as indicated by assessment.  - Educate pt/family on patient safety including physical limitations  - Instruct pt to call for assistance with activity based on assessment  - Mod

## 2018-11-20 NOTE — PROGRESS NOTES
Discharge instructions given to pt advocate Chago Dwyer.   Pt will be picked up around 8:30pm.  Iv is still in until pt is about to leave from unit

## 2018-11-20 NOTE — CM/SW NOTE
11/19/18 2000   CM/SW Referral Data   Referral Source    Pertinent Medical Hx   Primary Care Physician Name Gwendolyn Ramírez   Patient Info   Advanced directives?  Yes   Patient's [address]   Patient lives with (Son)   Discharge N

## 2018-11-20 NOTE — DIETARY NOTE
ADULT NUTRITION INITIAL ASSESSMENT    Pt is at moderate nutrition risk. Pt does not meet malnutrition criteria. RECOMMENDATIONS TO MD:  Will monitor fluid status given current hyponatremia.       NUTRITION DIAGNOSIS/PROBLEM: Inadequate oral intake rel (ONS) -RD added Ensure Enlive daily , pt prefers lara. Use/benefits discussed. - Vitamin and mineral supplements: Will monitor po & ONS intake and evaluate indication for Vit & Min supplements.  - Nutrition education: .  D/w pt importance of adequate nut need to re-eval fluid status. and Magnesium 1.7 --low Mag Oxide in place. NUTRITION RELATED PHYSICAL FINDINGS:   - Body Fat/Muscle Mass: well nourished despite significant wt loss. Appears muscle and fat mass sufficient.  However, limited physical per

## 2018-11-20 NOTE — TELEPHONE ENCOUNTER
Spoke with Gray-- unable to see pt since I have office until 7 pm.  Pt needs to be seen as outpatient

## 2018-11-21 NOTE — PROGRESS NOTES
Pt discharged home, iv removed, pt transported by pct to private vehicle, with Joan Fox the care taker

## 2018-11-21 NOTE — PROGRESS NOTES
S/w patient who was hard of hearing and couldn't understand what I was asking. She handed the phone over to caregiver [place]. [place] stated that this was her first day with the patient. States that she is doing good, has a good appetite.  Only complains w

## 2018-11-23 NOTE — PROGRESS NOTES
Spoke to patient's caregiver [place]. She states patient will no longer be seeing Dr. Dave Burrell as it is too difficult for her to leave the home. She states she has a new physician through [facility] that is seeing her and prescribing all meds.   López Quevedo

## 2018-11-28 NOTE — TELEPHONE ENCOUNTER
Janie/mela requesting verbal order to admit pt to hospice. Pietro Us states must be MD,APN or PA to approve order.  Please call thank you [phone]

## 2018-11-28 NOTE — TELEPHONE ENCOUNTER
Spoke to [address] [facility] and he is okay with providing order for hospital. Notified Tiffany Crowder at [facility], [facility]. Written order signed and faxed to [facility], [facility].

## 2018-11-28 NOTE — TELEPHONE ENCOUNTER
Called Erlinda Bliss to notify her that pt was switched over to a home visiting doctor. Erlinda Bliss states that doctor has been discontinued so patient's family provided Dr. Sruthi Bourgeois name.  She states patient has been admitted to hospice and has been started on comfort

## 2018-12-18 NOTE — ED NOTES
Pt safe to DC home per MD. Jailene Rothman waiting for pt. DC teaching done, verbalized understanding. Report given to Austen Du.

## 2018-12-18 NOTE — ED INITIAL ASSESSMENT (HPI)
Care assumed from EMS. Pt c/o suprapubic pressure since 0300 and states that her irby catheter has not been draining tonight. Pt unsure when the last time her irby was changed. Pt has about 50ml or dark balbina, cloudy urine in her irby bag.

## 2018-12-18 NOTE — ED PROVIDER NOTES
Patient Seen in: [facility] AND [facility] Emergency Department    History   Patient presents with:  Abdomen/Flank Pain (GI/)    Stated Complaint:  Suprapubic pain    HPI    81 yo F with PMH HTN, HL, breast CA, cystocyle, chronic urinary retentino with indwellin Tab,  Take 1 tablet (20 mg total) by mouth nightly. Raloxifene HCl 60 MG Oral Tab,  Take 1 tablet (60 mg total) by mouth once daily. Patient taking differently: Take 60 mg by mouth daily as needed.          Family History   Problem Relation Age of Onset ROUTINE     11/18/2018 URINE CULTURE Susceptibility      Escherichia coli     Not Specified    Ampicillin >=32  Resistant    Ampicillin + Sulbactam >=32  Resistant    Cefazolin 16  Sensitive    Ciprofloxacin 1  Sensitive    Gentamicin <=1  Sensitive    Kellee Chin

## 2018-12-18 NOTE — ED NOTES
RN attempted to irrigate pts catheter with no success. Pts catheter removed, pt urinated with removal of the catheter, unable to measure output. New irby catheter inserted with 150ml of urine drained. Will continue to monitor.

## 2018-12-26 NOTE — TELEPHONE ENCOUNTER
Current Outpatient Medications:  celecoxib 100 MG Oral Cap Take 1 capsule (100 mg total) by mouth 2 (two) times daily as needed for Pain.  As needed Disp: 30 capsule Rfl: 0     Joselin JEROME Key [id number]

## 2018-12-26 NOTE — TELEPHONE ENCOUNTER
PA already done and was approved on 12/21/18.  Notified Sedrick Syed at [facility].

## 2019-01-01 ENCOUNTER — TELEPHONE (OUTPATIENT)
Dept: NEPHROLOGY | Facility: CLINIC | Age: 84
End: 2019-01-01

## 2019-01-01 ENCOUNTER — OFFICE VISIT (OUTPATIENT)
Dept: NEPHROLOGY | Facility: CLINIC | Age: 84
End: 2019-01-01
Payer: MEDICARE

## 2019-01-01 VITALS
SYSTOLIC BLOOD PRESSURE: 127 MMHG | WEIGHT: 187.38 LBS | HEIGHT: 60 IN | HEART RATE: 82 BPM | BODY MASS INDEX: 36.79 KG/M2 | DIASTOLIC BLOOD PRESSURE: 70 MMHG

## 2019-01-01 DIAGNOSIS — E78.00 HYPERCHOLESTEROLEMIA: ICD-10-CM

## 2019-01-01 DIAGNOSIS — I82.412 DVT OF DEEP FEMORAL VEIN, LEFT (HCC): ICD-10-CM

## 2019-01-01 DIAGNOSIS — R53.1 WEAKNESS: ICD-10-CM

## 2019-01-01 DIAGNOSIS — I10 ESSENTIAL HYPERTENSION: Primary | ICD-10-CM

## 2019-01-01 PROCEDURE — 99215 OFFICE O/P EST HI 40 MIN: CPT | Performed by: INTERNAL MEDICINE

## 2019-01-01 PROCEDURE — G0463 HOSPITAL OUTPT CLINIC VISIT: HCPCS | Performed by: INTERNAL MEDICINE

## 2019-01-01 RX ORDER — CLOTRIMAZOLE AND BETAMETHASONE DIPROPIONATE 10; .64 MG/G; MG/G
1 CREAM TOPICAL 2 TIMES DAILY
Qty: 45 G | Refills: 0 | Status: SHIPPED | OUTPATIENT
Start: 2019-01-01

## 2019-01-01 RX ORDER — ACETAMINOPHEN 325 MG/1
325 TABLET ORAL EVERY 6 HOURS PRN
COMMUNITY

## 2019-01-01 RX ORDER — UBIDECARENONE/VITAMIN E MIXED 100 MG-100
1 CAPSULE ORAL DAILY
COMMUNITY

## 2019-01-01 RX ORDER — METOPROLOL TARTRATE 100 MG/1
100 TABLET ORAL 2 TIMES DAILY
Qty: 60 TABLET | Refills: 5 | Status: SHIPPED | OUTPATIENT
Start: 2019-01-01

## 2019-01-01 RX ORDER — TRAMADOL HYDROCHLORIDE 50 MG/1
50 TABLET ORAL EVERY 6 HOURS PRN
COMMUNITY

## 2019-01-01 RX ORDER — SENNA AND DOCUSATE SODIUM 50; 8.6 MG/1; MG/1
1 TABLET, FILM COATED ORAL AS NEEDED
COMMUNITY

## 2019-01-01 RX ORDER — LORAZEPAM 0.5 MG/1
0.5 TABLET ORAL EVERY 4 HOURS PRN
Refills: 5 | COMMUNITY
Start: 2019-01-01

## 2019-01-01 RX ORDER — MORPHINE SULFATE 20 MG/ML
0.25 SOLUTION ORAL EVERY 2 HOUR PRN
COMMUNITY

## 2019-02-06 NOTE — TELEPHONE ENCOUNTER
Pt's advocate Eileen Cadena stopped at desk- she is assisting pt with paperwork etc for hospice (Chinyere Burns) . She is looking for a copy of the initial certification of terminal illness done by [facility].  Lebron Sanchez went to medical records dept and there wa

## 2019-02-06 NOTE — TELEPHONE ENCOUNTER
Printed out orders that [id number] signed when [facility] asked for hospice orders. Explained to Chago Dwyer that this is all we have.  There's no formal \"certification of terminal illness\" that was completed from my understanding; just the plan of care orders fr

## 2019-02-11 NOTE — TELEPHONE ENCOUNTER
Pts advocate/home helper Song Holt states that pt will not be recertified for hospice so as of 2/25/19 she won't have nurse coming to house. Does pt need to be seen? Pt is bedridden. What can pt do to have pt certified for home health?   Pt has irby cat

## 2019-02-12 NOTE — TELEPHONE ENCOUNTER
Deann 173 helper. [employer]. [phone]. Address [facility] [address]. [facility] [address], [address] [facility]# [phone].

## 2019-02-12 NOTE — TELEPHONE ENCOUNTER
Brennan Betancourt returned call. She scheduled appt for 2/15/19 (cancellation) with ANDREY. She will bring to appt via transport service. No need to call back unless further questions.       FYI - She also states that [facility] RN/Savana Chambers may also

## 2019-02-16 NOTE — PROGRESS NOTES
[facility]  Nephrology Daily Progress Note    Gisele Ross  [id number]  80year old  Patient presents with:  TIA      HPI:   Gisele Ross is a 80year old female.   80-year-old female with a history of hypertension, peptic ulcer disease, chroni for abdominal pain, constipation, decreased appetite, diarrhea and vomiting  Genitourinary:  Negative for dysuria and hematuria  Hema/Lymph:  Negative for easy bleeding and easy bruising  Integumentary:  Negative for pruritus and rash  Musculoskeletal:  Ne 11/20/18   0750   GLU  112*   NA  130*   K  4.2   CL  97   CO2  26   BUN  9   CREATSERUM  0.37*   CA  8.6   BUNCREA  24.3*   ANIONGAP  7   OSMOCALC  269*   GFRNAA  >60   GFRAA  >60       Imaging        Medications:    Current Outpatient Medications:   •  L needed for Anxiety. , Disp: 20 tablet, Rfl: 0  •  Raloxifene HCl 60 MG Oral Tab, Take 1 tablet (60 mg total) by mouth once daily. (Patient taking differently: Take 60 mg by mouth daily as needed.  ), Disp: 30 tablet, Rfl: 5    Allergies:     Other

## 2019-02-16 NOTE — PATIENT INSTRUCTIONS
Have the hospice nurses contact me if they have any questions. Once you move to [facility] you should transition to a new physician in that area.

## 2019-02-19 NOTE — TELEPHONE ENCOUNTER
Pt is unable to sleep with chest cold - forgot to ask Dr for refill at visit - the only rx that helps her - Meprombate 400 mg

## 2019-02-19 NOTE — TELEPHONE ENCOUNTER
Patient contacted and relayed Dr. Tim Palm advice (That medication is too strong for her. She can only use Benadryl 25-50 mg nightly as needed) Patient understands advice and had no further questions.

## 2019-05-16 RX ORDER — RIVAROXABAN 20 MG/1
TABLET, FILM COATED ORAL
Qty: 30 TABLET | Refills: 2 | OUTPATIENT
Start: 2019-05-16

## 2022-02-25 NOTE — DISCHARGE SUMMARY
[facility]    Discharge Summary    Quirino Dangelo Patient Status:  Observation    1924 MRN [id number]   Location [facility] 5SW/SE Attending Clifford Valladares MD   Hosp Day # 1 PCP Annalisa Castro MD     Date of Admission: ceftriaxone  -urine culture sent and pending - multiple species >100k - will ask micro to be more specific - patient improving on ceftriaxone so can be discharged on Keflex with close outpt follow up - should follow up with PCP in 1 week     Essential hype known as:  LOPRESSOR      Take 1 tablet (100 mg total) by mouth 2 (two) times daily. Quantity:  60 tablet  Refills:  0     Oxybutynin Chloride 5 MG Tabs  Commonly known as:  DITROPAN      Take 5 mg by mouth 2 (two) times daily.    Refills:  0     Pantopra Clear bilaterally, pupils equal, round and reactive to light.

## 2022-12-28 NOTE — TELEPHONE ENCOUNTER
Pts advocate/Beba calling for script for rx:Xarelto & rx:Cordison Cream. Pt was seen today and is at the pharm now, thanks.   *[facility]
done
Sepsis Criteria were met:

## (undated) NOTE — IP AVS SNAPSHOT
Patient Demographics     Address  [address] [place], [place] [address] [address] Phone  [phone] [facility]) *Preferred*  [phone] [facility])      Emergency Contact(s)     Name Relation Home Work Oscar Son [address] Sister [phone] TAKE 1 TABLET (100 MG TOTAL) BY MOUTH 2 (TWO) TIMES DAILY. Bayron Luo MD         Oxybutynin Chloride 5 MG Tabs  Commonly known as:  DITROPAN  Next dose due: Tonight 9/8      Take 1 tablet (5 mg total) by mouth 2 (two) times daily.    Hernan Hardy [id number] PEG 3350 (MIRALAX) powder packet 17 g 09/08/18 0807 Given      [id number] Raloxifene HCl (EVISTA) tab 60 mg 09/08/18 0808 Given      [id number] bisacodyl (DULCOLAX) rectal suppository 10 mg 09/08/18 0600 Given      [id number] diazepam (VALIUM) tab Hypertension (cardiovascular)  Urinary Symptoms (urologic)  Swelling Edema (cardiovascular, metabolic)      HPI:   Carey Israel is a(n) 80year old female with a history of HTN and anxiety who presented to ER today due to feeling that she was having a arm.    Home medications    Prescriptions Prior to Admission:  MEPROBAMATE 400 MG Oral Tab TAKE 1 TABLET BY MOUTH TWICE A DAY AS NEEDED FOR PAIN   Nitrofurantoin Monohyd Macro 100 MG Oral Cap Take 1 capsule (100 mg total) by mouth 2 (two) times AST 19 03/17/2017   ALT 11 (L) 03/17/2017   .8 (H) 09/05/2018   TSH 1.32 06/04/2012   LIP 26 03/17/2017   MG 1.5 (L) 03/22/2017   CK 39 03/17/2017       Us Venous Doppler Leg Left - Diag Img (cpt=93971)    Result Date: 9/5/2018  CONCLUSION:  1.  Tomas Presenting Problem:  (Acute DVT, UTI, H/O Anxiety, HTN, Multiple falls at home)    Problem List  Principal Problem:    DVT of deep femoral vein, left [facility])      ASSESSMENT   Patient is a 80year old female admitted 9/5/2018 for Acute DVT.   Pt with recent H Recommend 24 hour supervision/assist upon DC from the hospital to ensure safety and for fall prevention.          DISCHARGE RECOMMENDATIONS  PT Discharge Recommendations: 24 hour care/supervision;Cont skilled therapy in a supervised setting;Sub-acute rehabi Distance (ft): 5 steps  Assistive Device: Rolling walker  Pattern:  (unseady and unsafe with premature sitting)  Stoop/Curb Assistance: Not tested           THERAPEUTIC EXERCISES  Lower Extremity Alternating marching  Ankle pumps  LAQ     Position Sitting Patient is a 80year old female admitted 9/5/2018 for Acute DVT. Pt has been on Heparin drip for past 24 hours and per RN ok to see pt for PT. Pt with recent H/O UTI and multiple falls at home.  Pt was seen for skilled PT in coordination with OT with RN kesha DISCHARGE RECOMMENDATIONS  PT Discharge Recommendations: 24 hour care/supervision;Cont skilled therapy in a supervised setting;Sub-acute rehabilitation     PLAN  PT Treatment Plan: Bed mobility; Body mechanics; Endurance; Patient education;Gait trainin Stoop/Curb Assistance: Not tested           THERAPEUTIC EXERCISES  Lower Extremity Alternating marching  Ankle pumps  LAQ     Position Sitting       Patient End of Session: Up in chair;Needs met;Call light within reach;RN aware of session/findings; All javier with recent H/O UTI and multiple falls at home. Pt was seen for skilled PT in coordination with OT with RN approval. Pt is alert, oriented, cooperative. Pt with no c/o SOB, dizziness. Pt with c/o LLE pain and swelling.     Upon assessment, pt with some weak therapy in a supervised setting;Sub-acute rehabilitation    PLAN  PT Treatment Plan: Bed mobility; Body mechanics; Endurance; Patient education;Gait training;Strengthening;Transfer training;Balance training  Rehab Potential : Good  Frequency (Obs):  (3-5x/wee Patient Regularly Uses:  (per pt, amb wit MI from bed to bathroom with use of furnitur)    Prior Level of [place]: Per pt, she was able to manage to get in and out of bed and was able to ambulate from bed to bathroom which is few feet away by holding How much help from another person does the patient currently need. ..   -   Moving to and from a bed to a chair (including a wheelchair)?: A Lot   -   Need to walk in hospital room?: A Lot   -   Climbing 3-5 steps with a railing?: Total     AM-PAC Score:  R Occupational Therapy Note signed by Adair Gilliland at 9/7/2018  3:47 PM  Version 1 of 1    Author:  Adair Gilliland Service:  — Author Type:  Occupational Therapy Assistant    Filed:  9/7/2018  3:47 PM Date of Service:  9/7/2018  3:37 PM Status:  Signed AM-PAC ‘6-Clicks’ Inpatient Daily Activity Short Form  How much help from another person does the patient currently need…  -   Putting on and taking off regular lower body clothing?: Total  -   Bathing (including washing, rinsing, drying)?: A Lot  -   Toil Occupational Therapy Note signed by August Londono OT at 9/6/2018 10:15 AM  Version 1 of 1    Author:  August Londono OT Service:  Cohng Miller Author Type:  Occupational Therapist    Filed:  9/6/2018 10:15 AM Date of Service:  9/6/2018  9:57 AM Stat Recommend MARVIN upon discharge. Anticipate pt will require 24 hour care moving forward as it appears pt is not safe to return home.        DISCHARGE RECOMMENDATIONS  OT Discharge Recommendations: Sub-acute rehabilitation;24 hour care/supervision  OT Device Re weeks.  Pt rarely leaves the house and when its necessary, calls the paramedics to Eve Holes  \"\"I'm just so tired\"    OCCUPATIONAL THERAPY EXAMINATION      OBJECTIVE  Precautions: Bed/chair alarm  Fall Risk: High fall risk    PAIN ASSESSMENT Patient will complete functional transfer with min A  Comment:     Patient will complete toileting with min A  Comment:     Patient will tolerate standing for 5 minutes in prep for adls with SBA 1x  Comment:   Pt will don pull up brief with min A  Comment

## (undated) NOTE — ED AVS SNAPSHOT
Shira Diaz   MRN: [id number]    Department:  [facility] Emergency Department   Date of Visit:  12/18/2018           Disclosure     Insurance plans vary and the physician(s) referred by the ER may not be covered by your plan.  Please contac within the next three months to obtain basic health screening including reassessment of your blood pressure.     IF THERE IS ANY CHANGE OR WORSENING OF YOUR CONDITION, CALL YOUR PRIMARY CARE PHYSICIAN AT ONCE OR RETURN IMMEDIATELY TO THE EMERGENCY DEPARTMEN

## (undated) NOTE — Clinical Note
FYI pt now has new physician through [facility] as it is too difficult for her to leave the home. Thank you.

## (undated) NOTE — ED AVS SNAPSHOT
Jeannette Woods   MRN: [id number]    Department:  [facility] Emergency Department   Date of Visit:  8/31/2018           Disclosure     Insurance plans vary and the physician(s) referred by the ER may not be covered by your plan.  Please contact within the next three months to obtain basic health screening including reassessment of your blood pressure.     IF THERE IS ANY CHANGE OR WORSENING OF YOUR CONDITION, CALL YOUR PRIMARY CARE PHYSICIAN AT ONCE OR RETURN IMMEDIATELY TO THE EMERGENCY DEPARTMEN

## (undated) NOTE — IP AVS SNAPSHOT
[address]  [address] [facility] ~ [phone]                Discharge Summary   3/17/2017    Ping Cortes           Admission Information        Provider Department    3/17/2017 Mehran Benson MD [facility] 5 Apply to groin 2x/day along with the hydrocortisone cr    Agnes Ingram                        furosemide 20 MG Tabs   Commonly known as:  LASIX   Next dose due:  3/23/17        Take 1 tablet my mouth every other day.     Graciela Damon Pantoprazole Sodium 40 MG Tbec   Commonly known as:  PROTONIX                   Immunization History as of 3/22/2017  Reviewed on 3/17/2017    INFLUENZA 1/8/2013, 12/7/2010, 11/14/2007, 10/25/2004    Pneumococcal Vaccine, Conjugate 4/13/2007      Recent H 130 (L) (03/20/17)  3.9 (03/20/17)  101      Radiology Exams     None      Patient Belongings       Most Recent Value    All belongings returned to patient at discharge Pt's bedside belongings    Medications Sent Home None to return    Medications Returned call your provider or healthcare team if you have any questions regarding your medications while at home.          Blood Pressure and Cardiac Medications     Losartan Potassium-HCTZ 100-25 MG Oral Tab    Metoprolol Tartrate 100 MG Oral Tab         Use: Christina Most common side effects:  Depends on the specific medication but generally include: diarrhea, constipation, headache, allergic reaction (itching, rash, hives)   What to report to your healthcare team: Pain, nausea/vomiting, no bowel movement in 2+ days, d

## (undated) NOTE — MR AVS SNAPSHOT
Catherine  [address]  [phone]               Thank you for choosing us for your health care visit with Meche Lopez MD.  We are glad to serve you and happy to provide you with this summary of Commonly known as:  NORCO           hydrocortisone 2.5 % Crea   Apply 1 Application topically 2 (two) times daily. lidocaine 5 % Ptch   Commonly known as:  LIDODERM           Losartan Potassium-HCTZ 100-25 MG Tabs   Take 1 tablet by mouth daily. drinks, candies and desserts   Eat plenty of low-fat dairy products High fat meats and dairy   Choose whole grain products Foods high in sodium   Water is best for hydration Fast food.    Eat at home when possible     Tips for increasing your physical activ

## (undated) NOTE — IP AVS SNAPSHOT
[facility]            (For Outpatient Use Only) Initial Admit Date: 9/5/2018   Inpt/Obs Admit Date: Inpt: 9/5/18 / Obs: N/A   Discharge Date:    Jose Saavedra:  [de-identified]   MRN: [de-identified]   CSN: [id number]        ENCOUNTER  Patient Class: Hospital Account Financial Class: Medicare    September 8, 2018

## (undated) NOTE — Clinical Note
Pt refusing HFU appt at this time stating she is too weak to come in.  TE sent to triage regarding condition update, medication request, and HFU appt. Thank you!